# Patient Record
Sex: FEMALE | Race: WHITE | NOT HISPANIC OR LATINO | Employment: FULL TIME | URBAN - METROPOLITAN AREA
[De-identification: names, ages, dates, MRNs, and addresses within clinical notes are randomized per-mention and may not be internally consistent; named-entity substitution may affect disease eponyms.]

---

## 2017-05-19 ENCOUNTER — OFFICE VISIT - RIVER FALLS (OUTPATIENT)
Dept: FAMILY MEDICINE | Facility: CLINIC | Age: 47
End: 2017-05-19

## 2017-05-19 ASSESSMENT — MIFFLIN-ST. JEOR: SCORE: 1265.86

## 2017-08-04 ENCOUNTER — OFFICE VISIT - RIVER FALLS (OUTPATIENT)
Dept: FAMILY MEDICINE | Facility: CLINIC | Age: 47
End: 2017-08-04

## 2017-08-11 ENCOUNTER — AMBULATORY - RIVER FALLS (OUTPATIENT)
Dept: FAMILY MEDICINE | Facility: CLINIC | Age: 47
End: 2017-08-11

## 2017-08-12 LAB
CHOLEST SERPL-MCNC: 222 MG/DL (ref 125–200)
CHOLEST/HDLC SERPL: 4.7 {RATIO}
GLUCOSE BLD-MCNC: 84 MG/DL (ref 65–99)
HDLC SERPL-MCNC: 47 MG/DL
LDLC SERPL CALC-MCNC: 146 MG/DL
NONHDLC SERPL-MCNC: 175 MG/DL
TRIGL SERPL-MCNC: 144 MG/DL

## 2017-08-18 ENCOUNTER — OFFICE VISIT - RIVER FALLS (OUTPATIENT)
Dept: FAMILY MEDICINE | Facility: CLINIC | Age: 47
End: 2017-08-18

## 2018-05-22 ENCOUNTER — OFFICE VISIT - RIVER FALLS (OUTPATIENT)
Dept: FAMILY MEDICINE | Facility: CLINIC | Age: 48
End: 2018-05-22

## 2018-06-14 ENCOUNTER — OFFICE VISIT - RIVER FALLS (OUTPATIENT)
Dept: FAMILY MEDICINE | Facility: CLINIC | Age: 48
End: 2018-06-14

## 2018-06-14 ASSESSMENT — MIFFLIN-ST. JEOR: SCORE: 1289.45

## 2018-06-20 ENCOUNTER — TRANSFERRED RECORDS (OUTPATIENT)
Dept: HEALTH INFORMATION MANAGEMENT | Facility: CLINIC | Age: 48
End: 2018-06-20

## 2018-06-20 LAB — HPV ABSTRACT: NORMAL

## 2018-06-27 ENCOUNTER — AMBULATORY - RIVER FALLS (OUTPATIENT)
Dept: FAMILY MEDICINE | Facility: CLINIC | Age: 48
End: 2018-06-27

## 2018-07-02 ENCOUNTER — OFFICE VISIT - RIVER FALLS (OUTPATIENT)
Dept: FAMILY MEDICINE | Facility: CLINIC | Age: 48
End: 2018-07-02

## 2018-07-02 ASSESSMENT — MIFFLIN-ST. JEOR: SCORE: 1291.26

## 2018-08-22 ENCOUNTER — OFFICE VISIT - RIVER FALLS (OUTPATIENT)
Dept: FAMILY MEDICINE | Facility: CLINIC | Age: 48
End: 2018-08-22

## 2019-03-06 ENCOUNTER — OFFICE VISIT - RIVER FALLS (OUTPATIENT)
Dept: FAMILY MEDICINE | Facility: CLINIC | Age: 49
End: 2019-03-06

## 2019-03-07 LAB
CHOLEST SERPL-MCNC: 222 MG/DL
CHOLEST/HDLC SERPL: 5 {RATIO}
ERYTHROCYTE [DISTWIDTH] IN BLOOD BY AUTOMATED COUNT: 15.4 % (ref 11–15)
GLUCOSE BLD-MCNC: 87 MG/DL (ref 65–99)
HCT VFR BLD AUTO: 32.6 % (ref 35–45)
HDLC SERPL-MCNC: 44 MG/DL
HGB BLD-MCNC: 10.2 GM/DL (ref 11.7–15.5)
LDLC SERPL CALC-MCNC: 139 MG/DL
MCH RBC QN AUTO: 24.5 PG (ref 27–33)
MCHC RBC AUTO-ENTMCNC: 31.3 GM/DL (ref 32–36)
MCV RBC AUTO: 78.4 FL (ref 80–100)
NONHDLC SERPL-MCNC: 178 MG/DL
PLATELET # BLD AUTO: 362 10*3/UL (ref 140–400)
PMV BLD: 11.4 FL (ref 7.5–12.5)
RBC # BLD AUTO: 4.16 10*6/UL (ref 3.8–5.1)
TRIGL SERPL-MCNC: 239 MG/DL
WBC # BLD AUTO: 5.3 10*3/UL (ref 3.8–10.8)

## 2019-03-08 LAB
FERRITIN SERPL-MCNC: 5 NG/ML (ref 10–232)
IRON SATURATION: 5 (ref 11–50)
IRON SERPL-MCNC: 24 MCG/DL (ref 45–160)
TIBC - QUEST: 453 (ref 250–450)

## 2019-07-09 ENCOUNTER — COMMUNICATION - RIVER FALLS (OUTPATIENT)
Dept: FAMILY MEDICINE | Facility: CLINIC | Age: 49
End: 2019-07-09

## 2019-10-21 ENCOUNTER — AMBULATORY - RIVER FALLS (OUTPATIENT)
Dept: FAMILY MEDICINE | Facility: CLINIC | Age: 49
End: 2019-10-21

## 2019-10-22 ENCOUNTER — COMMUNICATION - RIVER FALLS (OUTPATIENT)
Dept: FAMILY MEDICINE | Facility: CLINIC | Age: 49
End: 2019-10-22

## 2019-10-22 LAB
ERYTHROCYTE [DISTWIDTH] IN BLOOD BY AUTOMATED COUNT: 15.2 % (ref 11–15)
FERRITIN SERPL-MCNC: 10 NG/ML (ref 16–232)
HCT VFR BLD AUTO: 32.7 % (ref 35–45)
HGB BLD-MCNC: 10.6 GM/DL (ref 11.7–15.5)
IRON SATURATION: 12 (ref 16–45)
IRON SERPL-MCNC: 45 MCG/DL (ref 45–160)
MCH RBC QN AUTO: 26.6 PG (ref 27–33)
MCHC RBC AUTO-ENTMCNC: 32.4 GM/DL (ref 32–36)
MCV RBC AUTO: 82.2 FL (ref 80–100)
PLATELET # BLD AUTO: 331 10*3/UL (ref 140–400)
PMV BLD: 11.7 FL (ref 7.5–12.5)
RBC # BLD AUTO: 3.98 10*6/UL (ref 3.8–5.1)
TIBC - QUEST: 382 (ref 250–450)
WBC # BLD AUTO: 5.7 10*3/UL (ref 3.8–10.8)

## 2019-11-27 ENCOUNTER — OFFICE VISIT - RIVER FALLS (OUTPATIENT)
Dept: FAMILY MEDICINE | Facility: CLINIC | Age: 49
End: 2019-11-27

## 2020-02-12 ENCOUNTER — OFFICE VISIT - RIVER FALLS (OUTPATIENT)
Dept: FAMILY MEDICINE | Facility: CLINIC | Age: 50
End: 2020-02-12

## 2020-02-21 ENCOUNTER — COMMUNICATION - RIVER FALLS (OUTPATIENT)
Dept: FAMILY MEDICINE | Facility: CLINIC | Age: 50
End: 2020-02-21

## 2020-03-10 ENCOUNTER — OFFICE VISIT - RIVER FALLS (OUTPATIENT)
Dept: FAMILY MEDICINE | Facility: CLINIC | Age: 50
End: 2020-03-10

## 2020-03-10 ASSESSMENT — MIFFLIN-ST. JEOR: SCORE: 1306.68

## 2020-03-11 LAB
BUN SERPL-MCNC: 10 MG/DL (ref 7–25)
BUN/CREAT RATIO - HISTORICAL: NORMAL (ref 6–22)
CALCIUM SERPL-MCNC: 9.5 MG/DL (ref 8.6–10.4)
CHLORIDE BLD-SCNC: 102 MMOL/L (ref 98–110)
CO2 SERPL-SCNC: 26 MMOL/L (ref 20–32)
CREAT SERPL-MCNC: 0.82 MG/DL (ref 0.5–1.05)
EGFRCR SERPLBLD CKD-EPI 2021: 83 ML/MIN/1.73M2
ERYTHROCYTE [DISTWIDTH] IN BLOOD BY AUTOMATED COUNT: 14.8 % (ref 11–15)
GLUCOSE BLD-MCNC: 89 MG/DL (ref 65–99)
HCT VFR BLD AUTO: 34.5 % (ref 35–45)
HGB BLD-MCNC: 11.5 GM/DL (ref 11.7–15.5)
MCH RBC QN AUTO: 27.8 PG (ref 27–33)
MCHC RBC AUTO-ENTMCNC: 33.3 GM/DL (ref 32–36)
MCV RBC AUTO: 83.3 FL (ref 80–100)
PLATELET # BLD AUTO: 334 10*3/UL (ref 140–400)
PMV BLD: 11.7 FL (ref 7.5–12.5)
POTASSIUM BLD-SCNC: 4 MMOL/L (ref 3.5–5.3)
RBC # BLD AUTO: 4.14 10*6/UL (ref 3.8–5.1)
SODIUM SERPL-SCNC: 136 MMOL/L (ref 135–146)
WBC # BLD AUTO: 6.3 10*3/UL (ref 3.8–10.8)

## 2020-03-12 ENCOUNTER — COMMUNICATION - RIVER FALLS (OUTPATIENT)
Dept: FAMILY MEDICINE | Facility: CLINIC | Age: 50
End: 2020-03-12

## 2020-03-18 ENCOUNTER — OFFICE VISIT - RIVER FALLS (OUTPATIENT)
Dept: FAMILY MEDICINE | Facility: CLINIC | Age: 50
End: 2020-03-18

## 2020-04-21 LAB — COLOGUARD-ABSTRACT: NEGATIVE

## 2020-05-22 ENCOUNTER — COMMUNICATION - RIVER FALLS (OUTPATIENT)
Dept: FAMILY MEDICINE | Facility: CLINIC | Age: 50
End: 2020-05-22

## 2020-05-22 ENCOUNTER — OFFICE VISIT - RIVER FALLS (OUTPATIENT)
Dept: FAMILY MEDICINE | Facility: CLINIC | Age: 50
End: 2020-05-22

## 2020-05-22 LAB
HGB BLD-MCNC: 11.8 G/DL (ref 12–16)
MCV RBC AUTO: 87 FL (ref 80–100)

## 2020-06-03 ENCOUNTER — OFFICE VISIT - RIVER FALLS (OUTPATIENT)
Dept: FAMILY MEDICINE | Facility: CLINIC | Age: 50
End: 2020-06-03

## 2020-06-05 ENCOUNTER — OFFICE VISIT - RIVER FALLS (OUTPATIENT)
Dept: FAMILY MEDICINE | Facility: CLINIC | Age: 50
End: 2020-06-05

## 2020-06-17 ENCOUNTER — OFFICE VISIT - RIVER FALLS (OUTPATIENT)
Dept: FAMILY MEDICINE | Facility: CLINIC | Age: 50
End: 2020-06-17

## 2020-06-17 ASSESSMENT — MIFFLIN-ST. JEOR: SCORE: 1297.61

## 2020-08-12 ENCOUNTER — OFFICE VISIT - RIVER FALLS (OUTPATIENT)
Dept: FAMILY MEDICINE | Facility: CLINIC | Age: 50
End: 2020-08-12

## 2020-08-12 ASSESSMENT — MIFFLIN-ST. JEOR: SCORE: 1298.15

## 2020-11-10 ENCOUNTER — OFFICE VISIT - RIVER FALLS (OUTPATIENT)
Dept: FAMILY MEDICINE | Facility: CLINIC | Age: 50
End: 2020-11-10

## 2020-12-09 ENCOUNTER — OFFICE VISIT - RIVER FALLS (OUTPATIENT)
Dept: FAMILY MEDICINE | Facility: CLINIC | Age: 50
End: 2020-12-09

## 2020-12-09 ASSESSMENT — MIFFLIN-ST. JEOR: SCORE: 1289.08

## 2020-12-10 LAB
BUN SERPL-MCNC: 10 MG/DL (ref 7–25)
BUN/CREAT RATIO - HISTORICAL: NORMAL (ref 6–22)
CALCIUM SERPL-MCNC: 9.4 MG/DL (ref 8.6–10.4)
CHLORIDE BLD-SCNC: 102 MMOL/L (ref 98–110)
CHOLEST SERPL-MCNC: 242 MG/DL
CHOLEST/HDLC SERPL: 4.2 {RATIO}
CO2 SERPL-SCNC: 28 MMOL/L (ref 20–32)
CREAT SERPL-MCNC: 0.83 MG/DL (ref 0.5–1.05)
EGFRCR SERPLBLD CKD-EPI 2021: 81 ML/MIN/1.73M2
ERYTHROCYTE [DISTWIDTH] IN BLOOD BY AUTOMATED COUNT: 15 % (ref 11–15)
FERRITIN SERPL-MCNC: 22 NG/ML (ref 16–232)
GLUCOSE BLD-MCNC: 85 MG/DL (ref 65–99)
HCT VFR BLD AUTO: 39.2 % (ref 35–45)
HDLC SERPL-MCNC: 58 MG/DL
HGB BLD-MCNC: 12.7 GM/DL (ref 11.7–15.5)
IRON SATURATION: 22 (ref 16–45)
IRON SERPL-MCNC: 83 MCG/DL (ref 45–160)
LDLC SERPL CALC-MCNC: 148 MG/DL
MCH RBC QN AUTO: 28.7 PG (ref 27–33)
MCHC RBC AUTO-ENTMCNC: 32.4 GM/DL (ref 32–36)
MCV RBC AUTO: 88.5 FL (ref 80–100)
NONHDLC SERPL-MCNC: 184 MG/DL
PLATELET # BLD AUTO: 283 10*3/UL (ref 140–400)
PMV BLD: 11.3 FL (ref 7.5–12.5)
POTASSIUM BLD-SCNC: 4.2 MMOL/L (ref 3.5–5.3)
RBC # BLD AUTO: 4.43 10*6/UL (ref 3.8–5.1)
SODIUM SERPL-SCNC: 138 MMOL/L (ref 135–146)
TIBC - QUEST: 379 (ref 250–450)
TRIGL SERPL-MCNC: 217 MG/DL
TSH SERPL DL<=0.005 MIU/L-ACNC: 16.18 MIU/L
WBC # BLD AUTO: 4.8 10*3/UL (ref 3.8–10.8)

## 2020-12-11 ENCOUNTER — COMMUNICATION - RIVER FALLS (OUTPATIENT)
Dept: FAMILY MEDICINE | Facility: CLINIC | Age: 50
End: 2020-12-11

## 2021-07-08 ENCOUNTER — AMBULATORY - RIVER FALLS (OUTPATIENT)
Dept: FAMILY MEDICINE | Facility: CLINIC | Age: 51
End: 2021-07-08

## 2021-07-09 LAB
BUN SERPL-MCNC: 12 MG/DL (ref 7–25)
BUN/CREAT RATIO - HISTORICAL: ABNORMAL (ref 6–22)
CALCIUM SERPL-MCNC: 9.7 MG/DL (ref 8.6–10.4)
CHLORIDE BLD-SCNC: 100 MMOL/L (ref 98–110)
CHOLEST SERPL-MCNC: 239 MG/DL
CHOLEST/HDLC SERPL: 4.3 {RATIO}
CO2 SERPL-SCNC: 29 MMOL/L (ref 20–32)
CREAT SERPL-MCNC: 0.9 MG/DL (ref 0.5–1.05)
EGFRCR SERPLBLD CKD-EPI 2021: 74 ML/MIN/1.73M2
GLUCOSE BLD-MCNC: 107 MG/DL (ref 65–99)
HDLC SERPL-MCNC: 55 MG/DL
LDLC SERPL CALC-MCNC: 142 MG/DL
NONHDLC SERPL-MCNC: 184 MG/DL
POTASSIUM BLD-SCNC: 3.9 MMOL/L (ref 3.5–5.3)
SODIUM SERPL-SCNC: 139 MMOL/L (ref 135–146)
TRIGL SERPL-MCNC: 295 MG/DL
TSH SERPL DL<=0.005 MIU/L-ACNC: 12.79 MIU/L

## 2021-07-13 ENCOUNTER — OFFICE VISIT - RIVER FALLS (OUTPATIENT)
Dept: FAMILY MEDICINE | Facility: CLINIC | Age: 51
End: 2021-07-13

## 2021-11-24 ENCOUNTER — OFFICE VISIT - RIVER FALLS (OUTPATIENT)
Dept: FAMILY MEDICINE | Facility: CLINIC | Age: 51
End: 2021-11-24

## 2021-11-25 LAB — TSH SERPL DL<=0.005 MIU/L-ACNC: 3.28 MIU/L

## 2021-11-26 ENCOUNTER — COMMUNICATION - RIVER FALLS (OUTPATIENT)
Dept: FAMILY MEDICINE | Facility: CLINIC | Age: 51
End: 2021-11-26

## 2022-02-12 VITALS
HEART RATE: 87 BPM | WEIGHT: 158 LBS | BODY MASS INDEX: 26.98 KG/M2 | HEIGHT: 64 IN | DIASTOLIC BLOOD PRESSURE: 104 MMHG | TEMPERATURE: 98 F | SYSTOLIC BLOOD PRESSURE: 169 MMHG

## 2022-02-12 VITALS
HEART RATE: 85 BPM | DIASTOLIC BLOOD PRESSURE: 88 MMHG | SYSTOLIC BLOOD PRESSURE: 131 MMHG | WEIGHT: 164.6 LBS | TEMPERATURE: 97.6 F

## 2022-02-12 VITALS
HEART RATE: 64 BPM | WEIGHT: 154 LBS | SYSTOLIC BLOOD PRESSURE: 130 MMHG | HEIGHT: 64 IN | WEIGHT: 154.2 LBS | HEART RATE: 70 BPM | HEART RATE: 76 BPM | DIASTOLIC BLOOD PRESSURE: 84 MMHG | WEIGHT: 154.6 LBS | BODY MASS INDEX: 26.32 KG/M2 | BODY MASS INDEX: 26.43 KG/M2 | HEIGHT: 64 IN | SYSTOLIC BLOOD PRESSURE: 128 MMHG | SYSTOLIC BLOOD PRESSURE: 136 MMHG | BODY MASS INDEX: 26.4 KG/M2 | DIASTOLIC BLOOD PRESSURE: 88 MMHG | TEMPERATURE: 97.9 F | TEMPERATURE: 99.4 F | DIASTOLIC BLOOD PRESSURE: 76 MMHG

## 2022-02-12 VITALS
SYSTOLIC BLOOD PRESSURE: 122 MMHG | HEIGHT: 64 IN | HEART RATE: 68 BPM | DIASTOLIC BLOOD PRESSURE: 80 MMHG | BODY MASS INDEX: 25.44 KG/M2 | WEIGHT: 149 LBS

## 2022-02-12 VITALS
SYSTOLIC BLOOD PRESSURE: 118 MMHG | DIASTOLIC BLOOD PRESSURE: 81 MMHG | WEIGHT: 164.9 LBS | HEART RATE: 102 BPM | TEMPERATURE: 98.2 F | BODY MASS INDEX: 28.31 KG/M2

## 2022-02-12 VITALS
HEART RATE: 77 BPM | WEIGHT: 154.12 LBS | OXYGEN SATURATION: 96 % | DIASTOLIC BLOOD PRESSURE: 102 MMHG | TEMPERATURE: 97.7 F | HEIGHT: 64 IN | SYSTOLIC BLOOD PRESSURE: 157 MMHG | BODY MASS INDEX: 26.31 KG/M2

## 2022-02-12 VITALS
WEIGHT: 159 LBS | DIASTOLIC BLOOD PRESSURE: 108 MMHG | HEART RATE: 72 BPM | DIASTOLIC BLOOD PRESSURE: 98 MMHG | HEART RATE: 72 BPM | BODY MASS INDEX: 27.29 KG/M2 | BODY MASS INDEX: 26.78 KG/M2 | SYSTOLIC BLOOD PRESSURE: 142 MMHG | WEIGHT: 156 LBS | SYSTOLIC BLOOD PRESSURE: 166 MMHG

## 2022-02-12 VITALS
TEMPERATURE: 98 F | DIASTOLIC BLOOD PRESSURE: 80 MMHG | SYSTOLIC BLOOD PRESSURE: 132 MMHG | HEART RATE: 80 BPM | TEMPERATURE: 98.3 F | WEIGHT: 149.4 LBS

## 2022-02-12 VITALS
WEIGHT: 154.2 LBS | DIASTOLIC BLOOD PRESSURE: 80 MMHG | BODY MASS INDEX: 26.47 KG/M2 | TEMPERATURE: 98.3 F | HEART RATE: 80 BPM | SYSTOLIC BLOOD PRESSURE: 120 MMHG

## 2022-02-12 VITALS
WEIGHT: 156 LBS | WEIGHT: 156.12 LBS | HEART RATE: 100 BPM | HEART RATE: 76 BPM | TEMPERATURE: 98.8 F | SYSTOLIC BLOOD PRESSURE: 168 MMHG | HEIGHT: 64 IN | BODY MASS INDEX: 26.65 KG/M2 | TEMPERATURE: 98.4 F | DIASTOLIC BLOOD PRESSURE: 100 MMHG | HEIGHT: 64 IN | BODY MASS INDEX: 27.09 KG/M2 | DIASTOLIC BLOOD PRESSURE: 106 MMHG | SYSTOLIC BLOOD PRESSURE: 148 MMHG | SYSTOLIC BLOOD PRESSURE: 162 MMHG | DIASTOLIC BLOOD PRESSURE: 88 MMHG | HEART RATE: 91 BPM | WEIGHT: 157.8 LBS | OXYGEN SATURATION: 99 % | BODY MASS INDEX: 26.63 KG/M2

## 2022-02-12 VITALS
OXYGEN SATURATION: 98 % | TEMPERATURE: 97.9 F | SYSTOLIC BLOOD PRESSURE: 126 MMHG | HEART RATE: 80 BPM | BODY MASS INDEX: 27.46 KG/M2 | WEIGHT: 160 LBS | DIASTOLIC BLOOD PRESSURE: 82 MMHG

## 2022-02-15 NOTE — PROGRESS NOTES
Patient:   EDITH GODINEZ            MRN: [id number]            FIN: [id number]               Age:   49 years     Sex:  Female     :  1970   Associated Diagnoses:   Menorrhagia   Author:   Arsen Flood MD      Visit Information      Date of Service: 2020 09:48 am  Performing Location: [facility]  Encounter#: [id number]      Primary Care Provider (PCP):  Rj MONTGOMERY, Gonzalo    NPI# [id number]      Referring Provider:  Jose Mondragon MD    NPI# [id number]      Chief Complaint   2020 9:52 AM CST    Discuss hysterectomy.      Interval History   The patient is a 49-year-old female returning today to discuss hysterectomy.  She has menorrhagia and fairly severe migraines the day prior to and the day of onset of menses.  She otherwise has no GYN concerns.  On GYN review she does admit to having some incontinence with sneezing but it is fairly minor and she can usually avoid it by squeezing her legs together.  She has no other active medical problems.  She had no GYN surgeries and normal vaginal deliveries.        Review of Systems   Review  of systems is negative except as documented under interval history.      Health Status   Allergies:    Allergic Reactions (Selected)  No Known Medication Allergies   Medications:  (Selected)   Prescriptions  Prescribed  SUMAtriptan 25 mg oral tablet: See Instructions, Instructions: TAKE ONE TO TWO TABLETS BY MOUTH ONCE AT ONSET OF MIGRAINE HEADACHE AS NEEDED AS DIRECTED, # 18 tab(s), 11 Refill(s), Type: Soft Stop, Pharmacy: [facility], TAKE ONE TO TWO TABLETS BY MOUTH ONCE...  Vitron-C 125 mg-65 mg oral tablet: 1 tab(s), Oral, daily, # 90 tab(s), 3 Refill(s), Type: Maintenance, Pharmacy: [facility], 1 tab(s) Oral daily  amitriptyline 50 mg oral tablet: = 1 tab(s) ( 50 mg ), Oral, hs, # 90 tab(s), 3 Refill(s), Type: Maintenance, Pharmacy: [facility], note dose change, 1 tab(s) Oral hs    Problem list:    All Problems  Menstrual migraine / SNOMED CT 29916212 / Confirmed  Heavy menstrual period / SNOMED CT 6793038055 / Confirmed  Resolved: Hospitalized for pneumonia  Resolved: Pregnancy / SNOMED CT 964245286  Resolved: Pregnancy / SNOMED CT 559603477  Resolved: Pregnancy / SNOMED CT 344247354      Histories   Past Medical History:    Resolved  Hospitalized for pneumonia: Onset in 1973 at 3 years.  Resolved.  Pregnancy (099591037):  Resolved in 1993 at 22 years.  Pregnancy (499395590):  Resolved in 1996 at 25 years.  Pregnancy (326789533):  Resolved in 1998 at 27 years.   Family History:    Leukemia  Mother  Comments:  7/26/2010 12:17 PM CDT - Mirna Harrison  CLL  Miscellaneous  Father  Comments:  8/5/2013 3:34 PM [facility] - Boubacar Meza MD  arthritis and noncancerous jaw tumor  Diabetes  Son (Luis Fernando)     Procedure history:    No active procedure history items have been selected or recorded.   Social History:        Alcohol Assessment            Current, 1-2 times per week, 1 drinks/episode average.  2 drinks/episode maximum.      Tobacco Assessment            Never (less than 100 in lifetime)      Substance Abuse Assessment            Never      Employment and Education Assessment            Employed, Work/School description: [employer] .      Home and Environment Assessment            Marital status: .      Nutrition and Health Assessment            Type of diet: Regular.      Sexual Assessment            Sexually active: Yes.  Identifies as female, Sexual orientation: Straight or heterosexual.        Physical Examination   Vital Signs   2/12/2020 9:52 AM CST Peripheral Pulse Rate 72 bpm    Pulse Site Radial artery    HR Method Manual    Systolic Blood Pressure 166 mmHg  HI    Diastolic Blood Pressure 108 mmHg  HI    Mean Arterial Pressure 127 mmHg    BP Site Right arm    BP Method Manual      Gastrointestinal:       Abdomen: Abdomen is soft and nontender without masses..     Gynecologic:  External genitalia, vagina, and cervix are normal in appearance.  Bimanual examination finds the uterus slightly enlarged with no definite pelvic masses.  Ovaries were not palpated..       Impression and Plan   Diagnosis     Menorrhagia (BYZ05-QL N92.0).     Menorrhagia with secondary anemia..     Plan:  The patient will continue her iron now until surgery.  We discussed oophorectomy and she would like to have her ovaries out after our discussion.  We will plan laparoscopic assisted vaginal hysterectomy with bilateral salpingo-oophorectomy.  Scheduling will be done soon and she will see Dr. De La Rosa for preoperative visit..    Patient Instructions:       Counseled: Patient, Regarding diagnosis, Regarding treatment, Regarding medications, Verbalized understanding.

## 2022-02-15 NOTE — LETTER
(Inserted Image. Unable to display)   [facility] [place] [address]  November 26, 2021      EDITH GODINEZ   [place] [id number]        Dear EDITH,     Thank you for selecting [facility] (previously [facility]) for your healthcare needs. Below you will find the results of your recent test(s) done at our clinic.      Thyroid levels are now within range.  Plans to remain on current dosing indefinitely.      Result Name Current Result Previous Result   TSH (mIU/L)  3.28 11/24/2021 ((H)) 12.79 7/8/2021       Please contact me or my assistant at [phone] if you have any questions or concerns.     Sincerely,        Gonzalo De La Rosa MD    What do your labs mean?  Below is a glossary of commonly ordered labs:  LDL - Bad Cholesterol  HDL - Good Cholesterol  AST/ALT - Liver Function  Cr/Creatinine - Kidney Function  Microalbumin - Kidney Function  BUN - Kidney Function  PSA - Prostate   TSH - Thyroid Hormone  HgbA1c - Diabetes Test  Hgb (Hemoglobin) - Red Blood Cells

## 2022-02-15 NOTE — LETTER
(Inserted Image. Unable to display)   March 12, 2020      EDITH GODINEZ   [place], [place] [id number]        Dear EDITH,     Thank you for selecting [facility] ([facility]viously [facility] for your healthcare needs. Below you will find the results of your recent test(s) done at our clinic.      Pre-op labs are stable.  Stable degree of iron deficiency.      Result Name Current Result Previous Result Reference Range   Sodium Level (mmol/L)  136 3/10/2020  135 - 146   Potassium Level (mmol/L)  4.0 3/10/2020  3.5 - 5.3   Chloride Level (mmol/L)  102 3/10/2020  98 - 110   CO2 Level (mmol/L)  26 3/10/2020  20 - 32   Glucose Level (mg/dL)  89 3/10/2020  87 3/6/2019 65 - 99   BUN (mg/dL)  10 3/10/2020  7 - 25   Creatinine Level (mg/dL)  0.82 3/10/2020  0.50 - 1.05   BUN/Creat Ratio  NOT APPLICABLE 3/10/2020  6 - 22   eGFR (mL/min/1.73m2)  83 3/10/2020  > OR = 60 -    eGFR  (mL/min/1.73m2)  96 3/10/2020  > OR = 60 -    Calcium Level (mg/dL)  9.5 3/10/2020  8.6 - 10.4   Iron Level (mcg/dL) ((L)) 42 3/10/2020  45 10/21/2019 45 - 160   TIBC  398 3/10/2020  382 10/21/2019 250 - 450   Iron Saturation ((L)) 11 3/10/2020 ((L)) 12 10/21/2019 16 - 45   Ferritin (ng/mL) ((L)) 10 3/10/2020 ((L)) 10 10/21/2019 16 - 232   WBC  6.3 3/10/2020  5.7 10/21/2019 3.8 - 10.8   RBC  4.14 3/10/2020  3.98 10/21/2019 3.80 - 5.10   Hgb (gm/dL) ((L)) 11.5 3/10/2020 ((L)) 10.6 10/21/2019 11.7 - 15.5   Hct (%) ((L)) 34.5 3/10/2020 ((L)) 32.7 10/21/2019 35.0 - 45.0   MCV (fL)  83.3 3/10/2020  82.2 10/21/2019 80.0 - 100.0   MCH (pg)  27.8 3/10/2020 ((L)) 26.6 10/21/2019 27.0 - 33.0   MCHC (gm/dL)  33.3 3/10/2020  32.4 10/21/2019 32.0 - 36.0   RDW (%)  14.8 3/10/2020 ((H)) 15.2 10/21/2019 11.0 - 15.0   Platelet  334 3/10/2020  331 10/21/2019 140 - 400   MPV (fL)  11.7 3/10/2020  11.7 10/21/2019 7.5 - 12.5   Test in Question - Test(s) Ordered   FERRITIN IRON AND TOTAL IRON 3/10/2020   FERRITIN IRON  AND TOTAL IRON 3/6/2019    Misc Test CPT Code   457SB 7573SB 3/10/2020   457SB 7573SB 3/6/2019    Misc Test Client Contact  DR ADAMS 3/10/2020  ROBBI ADAMS 3/6/2019    Misc Test Comment  See comment 3/10/2020  See comment 3/10/2020    Misc Test Comment  See comment 3/10/2020  See comment 3/10/2020        Please contact me or my assistant at [phone] if you have any questions or concerns.     Sincerely,        Gonzalo Adams MD    What do your labs mean?  Below is a glossary of commonly ordered labs:  LDL - Bad Cholesterol  HDL - Good Cholesterol  AST/ALT - Liver Function  Cr/Creatinine - Kidney Function  Microalbumin - Kidney Function  BUN - Kidney Function  PSA - Prostate   TSH - Thyroid Hormone  HgbA1c - Diabetes Test  Hgb (Hemoglobin) - Red Blood Cells

## 2022-02-15 NOTE — NURSING NOTE
Comprehensive Intake Entered On:  5/22/2020 10:21 AM CDT    Performed On:  5/22/2020 10:18 AM CDT by Trisha Bailey CMA               Summary   Chief Complaint :   Pre-op. DOS 06/03/2020. Hysterectomy- [facility] Dr. Flood.   Menstrual Status :   Menarcheal   Weight Measured :   157.8 lb(Converted to: 157 lb 13 oz, 71.58 kg)    Systolic Blood Pressure :   158 mmHg (HI)    Diastolic Blood Pressure :   88 mmHg   Mean Arterial Pressure :   111 mmHg   Peripheral Pulse Rate :   100 bpm   BP Site :   Right arm   BP Method :   Manual   HR Method :   Electronic   Temperature Tympanic :   98.4 DegF(Converted to: 36.9 DegC)    Oxygen Saturation :   99 %   Trisha Bailey CMA - 5/22/2020 10:18 AM CDT   Health Status   Allergies Verified? :   Yes   Medication History Verified? :   Yes   Pre-Visit Planning Status :   N/A   Tobacco Use? :   Never smoker   Trisha Bailey CMA - 5/22/2020 10:18 AM CDT   Consents   Consent for Immunization Exchange :   Consent Granted   Consent for Immunizations to Providers :   Consent Granted   Trisha Bailey CMA - 5/22/2020 10:18 AM CDT   Meds / Allergies   (As Of: 5/22/2020 10:21:29 AM CDT)   Allergies (Active)   No Known Medication Allergies  Estimated Onset Date:   Unspecified ; Created By:   Chastity Kuhn CMA; Reaction Status:   Active ; Category:   Drug ; Substance:   No Known Medication Allergies ; Type:   Allergy ; Updated By:   Chastity Kuhn CMA; Reviewed Date:   7/2/2018 12:48 PM CDT        Medication List   (As Of: 5/22/2020 10:21:29 AM CDT)   Prescription/Discharge Order    amitriptyline  :   amitriptyline ; Status:   Prescribed ; Ordered As Mnemonic:   amitriptyline 50 mg oral tablet ; Simple Display Line:   50 mg, 1 tab(s), Oral, hs, 90 tab(s), 3 Refill(s) ; Ordering Provider:   Gonzalo De La Rosa MD; Catalog Code:   amitriptyline ; Order Dt/Tm:   11/27/2019 4:40:21 PM CST          ascorbic acid-carbonyl iron  :   ascorbic acid-carbonyl iron ; Status:   Prescribed ; Ordered As Mnemonic:   Vitron-C  125 mg-65 mg oral tablet ; Simple Display Line:   1 tab(s), Oral, daily, 90 tab(s), 3 Refill(s) ; Ordering Provider:   Gonzalo De La Rosa MD; Catalog Code:   ascorbic acid-carbonyl iron ; Order Dt/Tm:   3/7/2019 10:53:26 AM CST          SUMAtriptan  :   SUMAtriptan ; Status:   Prescribed ; Ordered As Mnemonic:   SUMAtriptan 25 mg oral tablet ; Simple Display Line:   See Instructions, TAKE ONE TO TWO TABLETS BY MOUTH ONCE AT ONSET OF MIGRAINE HEADACHE AS NEEDED AS DIRECTED, 18 unknown unit, 3 Refill(s) ; Ordering Provider:   Gonzalo De La Rosa MD; Catalog Code:   SUMAtriptan ; Order Dt/Tm:   3/24/2020 11:22:44 AM CDT            ID Risk Screen   Recent Travel History :   No recent travel   Family Member Travel History :   No recent travel   Other Exposure to Infectious Disease :   Unknown   Trisha Bailey CMA - 5/22/2020 10:18 AM CDT   More Vitals   Systolic Blood Pressure Repeat :   148 mmHg   Diastolic Blood Pressure Repeat :   88 mmHg   BP Site Repeat :   Right arm   Jose/Jessica NEWELL - 5/22/2020 10:53 AM CDT

## 2022-02-15 NOTE — PROGRESS NOTES
Patient:   YOLA GODINEZ            MRN: [id number]            FIN: [id number]               Age:   49 years     Sex:  Female     :  1970   Associated Diagnoses:   Migraine aura, persistent; Iron deficiency anemia; Menorrhagia   Author:   Gonzalo De La Rosa MD      Visit Information      Date of Service: 03/10/2020 03:48 pm  Performing Location: [facility]  Encounter#: [id number]      Primary Care Provider (PCP):  Gonzalo De La Rosa MD    NPI# [id number]      Referring Provider:  Gonzalo De La Rosa MD    NPI# [id number]      Chief Complaint   3/10/2020 3:55 PM CDT    preop - scheduled for  Lap assisted Vag hysterectomy/BSO with TAW at [facility] on 3/18  (Modified)             Additional Information:No additional information recorded during visit.   Chief complaint and symptoms as noted above and confirmed with patient.  Recent lab and diagnostic studies reviewed with patient      History of Present Illness   2014: Yola presents to clinic with worsening migraine headaches.  She says that headaches typically have a predictable or a with left-sided cheek tingling and pain followed by headache extending down into neck and back of head.  Headaches typically can last 1-2 days.  Usually are fairly responsive to higher dose ibuprofen; 800-1600 mg a day.  More recently is finding that ibuprofen has not been as effective.  Typically states that headaches occur approximately every 2 weeks.  She strictly avoids ibuprofen when not having migraine headaches.  Historically has not found Excedrin to be as helpful.  She s never tried prescription medications for her migraine.  She s also never been on any maintenance therapies. 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    2019:  Yola returns for follow-up.  She has had good success with amitriptyline introduction now taking 50 mg at night.  Her migraine frequency is essentially now only limited to development of menses.  Has been taking Imitrex preemptively just the beginning of her periods.   Had not been very dedicated to oral iron therapy though has try to increase regular consumption over the last few months.  Has only been taking once a day.  Still has heavy menses typically having bleeding I will persist for 4 to 5 days with some clotting.  She has been doing quite a bit of research on her own about whether she wants to pursue hysterectomy.  She has appropriate questions here today.    3/10/2020: Yola presents for preoperative evaluation before planned total hysterectomy due to history of menorrhagia and iron deficiency anemia.  Surgery scheduled for March 18.  She has no significant surgical history.  Only previous anesthesia exposures to removal of wisdom teeth.  No personal or family history of venous thromboembolism.  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  She states that she works in the school with recent cold with 19 cases exposure this past weekend.  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  She demonstrates no active flulike symptoms.  Denies any fever.  No other history of travel.         Review of Systems   Constitutional:  No fever, No chills.    Eye:  Negative except as documented in history of present illness.    Ear/Nose/Mouth/Throat:  Negative except as documented in history of present illness.    Respiratory:  No shortness of breath.    Cardiovascular:  No chest pain, No palpitations, No peripheral edema, No syncope.    Gastrointestinal:  No nausea, No vomiting, No abdominal pain.    Genitourinary:  No dysuria, No hematuria.    Gynecologic:  menorrhagia.    Hematology/Lymphatics:  Negative except as documented in history of present illness.    Endocrine:  No excessive thirst, No polyuria.    Immunologic:  No recurrent fevers.    Musculoskeletal:  No joint pain, No muscle pain.    Neurologic:  Alert and oriented X4, Headache, No numbness, No tingling.        Health Status   Allergies:    Allergic Reactions (Selected)  No Known Medication Allergies   Medications:  (Selected)   Prescriptions  Prescribed  SUMAtriptan 25 mg oral tablet: See Instructions, Instructions: TAKE ONE TO TWO TABLETS BY MOUTH ONCE AT ONSET OF MIGRAINE HEADACHE AS NEEDED AS DIRECTED, # 18 tab(s), 11 Refill(s), Type: Soft Stop, Pharmacy: [facility], TAKE ONE TO TWO TABLETS BY MOUTH ONCE...  Vitron-C 125 mg-65 mg oral tablet: 1 tab(s), Oral, daily, # 90 tab(s), 3 Refill(s), Type: Maintenance, Pharmacy: [facility], 1 tab(s) Oral daily  amitriptyline 50 mg oral tablet: = 1 tab(s) ( 50 mg ), Oral, hs, # 90 tab(s), 3 Refill(s), Type: Maintenance, Pharmacy: [facility], note dose change, 1 tab(s) Oral hs,    Medications          *denotes recorded medication          SUMAtriptan 25 mg oral tablet: See Instructions, TAKE ONE TO TWO TABLETS BY MOUTH ONCE AT ONSET OF MIGRAINE HEADACHE AS NEEDED AS DIRECTED, 18 tab(s), 11 Refill(s).          amitriptyline 50 mg oral tablet: 50 mg, 1 tab(s), Oral, hs, 90 tab(s), 3 Refill(s).          Vitron-C 125 mg-65 mg oral tablet: 1 tab(s), Oral, daily, 90 tab(s), 3 Refill(s).       Problem list:    All Problems  Heavy menstrual period / SNOMED CT 0386309457 / Confirmed  Menstrual migraine / SNOMED CT 82430089 / Confirmed  Resolved: Hospitalized for pneumonia  Resolved: Pregnancy / SNOMED CT 964105228  Resolved: Pregnancy / SNOMED CT 910336697  Resolved: Pregnancy / SNOMED CT 361195034      Histories   Past Medical History:    Resolved  Hospitalized for pneumonia: Onset in 1973 at 3 years.  Resolved.  Pregnancy (886402222):  Resolved in 1993 at 22 years.  Pregnancy (971139186):  Resolved in 1996 at 25 years.  Pregnancy (788316166):  Resolved in 1998 at 27 years.   Family History:    Leukemia  Mother  Comments:  7/26/2010 12:17 PM [place] - Mirna Harrison  CLL  Miscellaneous  Father  Comments:  8/5/2013  3:34 PM CDT - Boubacar Meza MD  arthritis and noncancerous jaw tumor  Diabetes  Son (Luis Fernando)     Procedure history:    No active procedure history items have been selected or recorded.   Social History:        Alcohol Assessment            Current, 1-2 times per week, 1 drinks/episode average.  2 drinks/episode maximum.      Tobacco Assessment            Never (less than 100 in lifetime)      Substance Abuse Assessment            Never      Employment and Education Assessment            Employed, Work/School description: [employer] .      Home and Environment Assessment            Marital status: .      Nutrition and Health Assessment            Type of diet: Regular.      Sexual Assessment            Sexually active: Yes.  Identifies as female, Sexual orientation: Straight or heterosexual.        Physical Examination   vital signs stable, as noted above   Vital Signs   3/10/2020 4:24 PM CDT Peripheral Pulse Rate 87 bpm    Systolic Blood Pressure 169 mmHg  HI    Diastolic Blood Pressure 104 mmHg  HI    Mean Arterial Pressure 126 mmHg   3/10/2020 3:55 PM CDT Temperature Tympanic 98 DegF    Peripheral Pulse Rate 88 bpm    Systolic Blood Pressure 158 mmHg  HI    Diastolic Blood Pressure 100 mmHg  HI    Mean Arterial Pressure 119 mmHg    BP Site Right arm      Measurements from flowsheet : Measurements   3/10/2020 4:24 PM CDT Height Measured - Standard 64 in   3/10/2020 3:55 PM CDT Height Measured - Standard 64 in    Weight Measured - Standard 158 lb    BSA 1.8 m2    Body Mass Index 27.12 kg/m2  HI      General:  Alert and oriented, No acute distress.    Eye:  Pupils are equal, round and reactive to light, Extraocular movements are intact.    HENT:  Normocephalic.    Neck:  Supple, Non-tender.    Respiratory:  Lungs are clear to auscultation, Respirations are non-labored.    Cardiovascular:  Normal rate, Regular rhythm, No murmur, No edema.    Gastrointestinal:  Soft, Non-tender, Non-distended.     Neurologic:  Alert, Oriented, Normal motor function, No focal deficits.    Cognition and Speech:  Oriented, Speech clear and coherent.    Psychiatric:  Appropriate mood & affect.       Review / Management   Results review      Impression and Plan   Diagnosis     Migraine aura, persistent (HVX12-JW G43.509).     Iron deficiency anemia (EQF04-SE D50.9).     Menorrhagia (QHW02-XK N92.0).         .) pre-op, planned abdominal hysterectomy, 3/18/2020, [facility] with Dr. Flood  ECG: NSR  - obtain BMP, CBC  - no necessary medication changes  - no h/o VTE, anesthesia abnormalities  - advised monitoring blood pressures perioperatively  - low risk exposure to Covid19 being a teacher in "[employer] school.  Exposure to children without proven disease (only parent testing positive), also with no endorsed symptoms.  I see no justified reason to postpone elective surgery, though if developing any subsequent respiratory symptoms, then would advise testing  moderate risk procedure in setting of low risk patient profile; no further pre-operative risk assessment indicated     low risk procedure in setting of low risk patient profile; no further pre-operative risk assessment indicated     .) migraine HAs  - having success with sumatriptan 25-50mg as needed; typically occurring at time of menstuation; potential perimenopausal association  - good response since intro of amitriptyline 50mg qhs    .) iron deficiency anemia; I suspect mainly related to menorrhagia   - hgb 10.6; MCV 82   - on vitamin C + elemental iron - advised to increase to BID   - consider future hysterectomy if continued menorrhagia    .) health maintenance   - she did see TAW: normal PAP w/ HPV negative (6/2018)   - continue annual mammo   - normal FBS   - mildly elevated LDL, triglycerides   - normal FIT testing (6/2018) - will look to pursue further non-invasive testing    RTC as previously scheduled

## 2022-02-15 NOTE — TELEPHONE ENCOUNTER
---------------------  From: Judit Clemente CMA   Sent: 6/5/2020 10:18:44 AM CDT  Subject: Hysterectomy f/u     Received a message that pt is in need of pain management post hysterectomy on Wed (6/3).    I called pt and she said that was not the case-she had a f/u appt w/ [facility] this morning and just wasnt feeling up to coming in so she wants to know if it was OK to cancel. I told her that was OK and I advised to monitor for signs of infection, febrile, or uncontrolled pain. She will do this. I have transferred her to schedule a post op appt with Dr. Flood.

## 2022-02-15 NOTE — LETTER
(Inserted Image. Unable to display)   April 21, 2020      EDITH GODINEZ   [place], [place] [id number]        Dear EDITH,      Thank you for selecting [facility] ([facility] for your healthcare needs.       Cologuard testing is normal.  Current recommendations to complete every 3 years.           Please contact me or my assistant at [phone] if you have any questions or concerns.     Sincerely,        Gonzalo De La Rosa MD

## 2022-02-15 NOTE — LETTER
(Inserted Image. Unable to display)   October 22, 2019      EDITH GODINEZ   [place], [place] [id number]        Dear EDITH,     Thank you for selecting [facility] (previously [facility]) for your healthcare needs. Below you will find the results of your recent test(s) done at our clinic.      Degree of iron deficiency anemia is essentially unchanged.  Plans would be to either remain on oral iron for extended period of time or consideration of IV iron infusions for more expedited improvement in iron stores.      Result Name Current Result Previous Result Reference Range   Iron Level (mcg/dL)  45 10/21/2019 ((L)) 24 3/6/2019 45 - 160   TIBC  382 10/21/2019 ((H)) 453 3/6/2019 250 - 450   Iron Saturation ((L)) 12 10/21/2019 ((L)) 5 3/6/2019 16 - 45   Ferritin (ng/mL) ((L)) 10 10/21/2019 ((L)) 5 3/6/2019 16 - 232   WBC  5.7 10/21/2019  5.3 3/6/2019 3.8 - 10.8   RBC  3.98 10/21/2019  4.16 3/6/2019 3.80 - 5.10   Hgb (gm/dL) ((L)) 10.6 10/21/2019 ((L)) 10.2 3/6/2019 11.7 - 15.5   Hct (%) ((L)) 32.7 10/21/2019 ((L)) 32.6 3/6/2019 35.0 - 45.0   MCV (fL)  82.2 10/21/2019 ((L)) 78.4 3/6/2019 80.0 - 100.0   MCH (pg) ((L)) 26.6 10/21/2019 ((L)) 24.5 3/6/2019 27.0 - 33.0   MCHC (gm/dL)  32.4 10/21/2019 ((L)) 31.3 3/6/2019 32.0 - 36.0   RDW (%) ((H)) 15.2 10/21/2019 ((H)) 15.4 3/6/2019 11.0 - 15.0   Platelet  331 10/21/2019  362 3/6/2019 140 - 400   MPV (fL)  11.7 10/21/2019  11.4 3/6/2019 7.5 - 12.5       Please contact me or my assistant at [phone] if you have any questions or concerns.     Sincerely,        Gonzalo De La Rosa MD    What do your labs mean?  Below is a glossary of commonly ordered labs:  LDL - Bad Cholesterol  HDL - Good Cholesterol  AST/ALT - Liver Function  Cr/Creatinine - Kidney Function  Microalbumin - Kidney Function  BUN - Kidney Function  PSA - Prostate   TSH - Thyroid Hormone  HgbA1c - Diabetes Test  Hgb (Hemoglobin) - Red Blood Cells

## 2022-02-15 NOTE — TELEPHONE ENCOUNTER
---------------------  From: Chastity Khun CMA   Sent: 4/23/2019 10:59:51 AM CDT  Subject: General Message-imitrex refill     Phone Message    PCP:   DASHA      Time of Call:  0934       Person Calling:  self  Phone number:  [phone], ok LM     Returned call at: 1055    Note:   calling about refills of her imitrex  Refilled 3/6 by DASHA.  Pt notified and told her I would verify FF had script.  FF contacted     Last office visit and reason:  3/6/2019

## 2022-02-15 NOTE — PROGRESS NOTES
Patient:   EDITH GODINEZ            MRN: [id number]            FIN: [id number]               Age:   47 years     Sex:  Female     :  1970   Associated Diagnoses:   Well adult; Heavy menstrual period; Menstrual migraine   Author:   Tiera Leslie      Chief Complaint   2018 2:53 PM CDT    Pt here for annual px and pap.        Well Adult History   Well Adult History             The patient presents for well adult exam, -She is due for pap, have been normal  -She has migraines when she has her menses, they are very predictable and not always managed with Imitrex. Periods are very heavy, has to leave work at sometimes and plan vacations around this. used to use oc's without problems, she and  using NFP now. She would like to consider re starting oc's to help with heavy periods and HA.. She is a non smoker  -Vaccines UTD.  The general health status is good.  The patient's diet is described as balanced.  Exercise: none.  Associated symptoms consist of none.  Last menstrual period: regular, LMP just ending.  Medical encounters:.  Additional pertinent history: tobacco use none.        Review of Systems   Constitutional:  Negative except as documented in history of present illness.    Eye:  Negative except as documented in history of present illness.    Respiratory:  Negative except as documented in history of present illness.    Cardiovascular:  Negative except as documented in history of present illness.    Breast:  Negative.    Gastrointestinal:  Negative except as documented in history of present illness.    Genitourinary:  Negative except as documented in history of present illness.    Gynecologic:  Negative except as documented in history of present illness.    Hematology/Lymphatics:  Negative except as documented in history of present illness.    Endocrine:  Negative except as documented in history of present illness.    Immunologic:  Negative except as documented in history of present illness.     Musculoskeletal:  Negative except as documented in history of present illness.    Integumentary:  Negative except as documented in history of present illness.    Neurologic:  Negative except as documented in history of present illness.    Psychiatric:  Negative except as documented in history of present illness.              Health Status   Allergies:    Allergic Reactions (Selected)  No Known Medication Allergies   Medications:  (Selected)   Prescriptions  Prescribed  Lutera 100 mcg-20 mcg oral tablet: 1 tab(s), PO, Daily, Instructions: take 21 days of active tablets then skip placebos and start the next pack, # 84 tab(s), 0 Refill(s), Type: Maintenance, Pharmacy: [facility], 1 tab(s) po daily,Instr:take 21 days of active ta...  SUMAtriptan 25 mg oral tablet: See Instructions, Instructions: TAKE 1 TO 2 TABLET(S) BY MOUTH ONCE AS NEEDED FOR MIGRAINE HEADACHE, # 18 tab(s), 5 Refill(s), Type: Soft Stop, Pharmacy: [facility], TAKE 1 TO 2 TABLET(S) BY MOUTH ONCE AS NEEDED FOR MIGRAINE H...  Documented Medications  Documented  ibuprofen: ( 800 mg ), po, q6 hrs, PRN: as needed for menstrual pain, h/a's, 0 Refill(s), Type: Maintenance   Problem list:    All Problems  Resolved: Hospitalized for pneumonia  Resolved: Pregnancy / SNOMED CT 532382251  Resolved: Pregnancy / SNOMED CT 881593816  Resolved: Pregnancy / SNOMED CT 325969256      Histories   Past Medical History:    Resolved  Hospitalized for pneumonia: Onset in 1973 at 3 years.  Resolved.  Pregnancy (400873335):  Resolved in 1993 at 22 years.  Pregnancy (525840941):  Resolved in 1996 at 25 years.  Pregnancy (347026960):  Resolved in 1998 at 27 years.   Family History:    Mother  Leukemia  Comments:  7/26/2010 12:17 PM - Mirna Harrison  CLL  Father  Miscellaneous  Comments:  8/5/2013 3:34 PM - Boubacar Meza MD  arthritis and noncancerous jaw tumor  Sister: Yadira      History is negative.  Brother: Dom       History is negative.  Sister: Kyra      History is negative.  Brother: Gonzalo      History is negative.  Brother: Fabian      History is negative.     Procedure history:    No active procedure history items have been selected or recorded.      Physical Examination   Vital Signs   6/14/2018 2:53 PM CDT Temperature Tympanic 99.4 DegF    Peripheral Pulse Rate 64 bpm    Pulse Site Radial artery    HR Method Manual    Systolic Blood Pressure 136 mmHg  HI    Diastolic Blood Pressure 88 mmHg  HI    Mean Arterial Pressure 104 mmHg    BP Site Right arm    BP Method Manual      Measurements from flowsheet : Measurements   6/14/2018 2:53 PM CDT Height Measured - Standard 64 in    Weight Measured - Standard 154.2 lb    BSA 1.78 m2    Body Mass Index 26.47 kg/m2  HI      General:  Alert and oriented, No acute distress, vital signs stable, as noted above.    Eye:  Pupils are equal, round and reactive to light, Extraocular movements are intact, Normal conjunctiva.    HENT:  Normocephalic, Tympanic membranes are clear, Normal hearing, Oral mucosa is moist, No pharyngeal erythema.    Neck:  Supple, Non-tender, No lymphadenopathy, No thyromegaly.    Respiratory:  Lungs are clear to auscultation, Respirations are non-labored, Breath sounds are equal, Symmetrical chest wall expansion.    Cardiovascular:  Normal rate, Regular rhythm, No murmur, No edema.    Breast:  No mass, No tenderness, No discharge, Breasts examined .  No infra nor supraclavicular nodes palpable.  No axillary nodes or masses palpable.  No nipple discharge. Breasts normal throughout.    Gastrointestinal:  Soft, Non-tender, Non-distended, No organomegaly.    Genitourinary:  Normal genitalia for age and sex, No inguinal tenderness, No urethral discharge, No lesions.         Perineum: Within normal limits.         Groin/ inguinal region: Within normal limits.         Urethra: Within normal limits.         Vagina: Within normal limits.         Labia: Within normal limits.          Cervix: Within normal limits.         Uterus: Within normal limits.         Adnexa: Within normal limits.    Lymphatics:  no axillary, no supra or infraclavicular nor inguinal lymphadenopathy palpable.    Musculoskeletal:  Normal range of motion, Normal strength, No deformity, Normal gait.    Integumentary:  Warm, Dry, Pink, Intact, No rash.    Neurologic:  Alert, Oriented, Normal sensory, Normal motor function, Cranial Nerves II-XII are grossly intact.    Psychiatric:  Cooperative, Appropriate mood & affect, Normal judgment, PHQ 9/CAGE questionaire reviewed and discussed with patient,  see score.       Impression and Plan   Diagnosis     Well adult (GAD46-VP Z00.00).     Heavy menstrual period (MAS24-PM N92.0).     Menstrual migraine (AXV37-FW G43.829).     Patient Instructions:       Counseled: Patient, Regarding diagnosis, Regarding medications, Verbalized understanding, counseled on health benefits of healthy weight, regular exercise, healthy diet,  Counseled pt on use/risks/benefits of hormonal birth control use including ACHES symptoms.   recheck pt in 3 months, she will cycle continuously and start today.    Orders     Orders (Selected)   Prescriptions  Prescribed  Lutera 100 mcg-20 mcg oral tablet: 1 tab(s), PO, Daily, Instructions: take 21 days of active tablets then skip placebos and start the next pack, # 84 tab(s), 0 Refill(s), Type: Maintenance, Pharmacy: [facility], 1 tab(s) po daily,Instr:take 21 days of active ta...  SUMAtriptan 25 mg oral tablet: See Instructions, Instructions: TAKE 1 TO 2 TABLET(S) BY MOUTH ONCE AS NEEDED FOR MIGRAINE HEADACHE, # 18 tab(s), 5 Refill(s), Type: Soft Stop, Pharmacy: [facility], TAKE 1 TO 2 TABLET(S) BY MOUTH ONCE AS NEEDED FOR MIGRAINE H....

## 2022-02-15 NOTE — NURSING NOTE
Vital Signs Entered On:  12/9/2020 8:22 AM CST    Performed On:  12/9/2020 8:22 AM CST by Chastity Kuhn CMA               Vital Signs   Systolic Blood Pressure :   157 mmHg (HI)    Diastolic Blood Pressure :   102 mmHg (HI)    Mean Arterial Pressure :   120 mmHg   BP Site :   Left arm   Chastity Kuhn CMA - 12/9/2020 8:22 AM CST

## 2022-02-15 NOTE — NURSING NOTE
Comprehensive Intake Entered On:  11/27/2019 1:33 PM CST    Performed On:  11/27/2019 1:29 PM CST by Jolynn Gilliam CMA               Summary   Chief Complaint :   migraine f/u and refill.    Menstrual Status :   Menarcheal   Weight Measured :   156 lb(Converted to: 156 lb 0 oz, 70.76 kg)    Systolic Blood Pressure :   142 mmHg (HI)    Diastolic Blood Pressure :   98 mmHg (HI)    Mean Arterial Pressure :   113 mmHg   Peripheral Pulse Rate :   72 bpm   BP Site :   Right arm   Pulse Site :   Radial artery   BP Method :   Manual   HR Method :   Manual   Jolynn Gilliam CMA - 11/27/2019 1:29 PM CST   Health Status   Allergies Verified? :   Yes   Medication History Verified? :   Yes   Pre-Visit Planning Status :   Completed   Jolynn Gilliam CMA - 11/27/2019 1:29 PM CST   Meds / Allergies   (As Of: 11/27/2019 1:33:45 PM CST)   Allergies (Active)   No Known Medication Allergies  Estimated Onset Date:   Unspecified ; Created By:   Chastity Kuhn CMA; Reaction Status:   Active ; Category:   Drug ; Substance:   No Known Medication Allergies ; Type:   Allergy ; Updated By:   Chastity Kuhn CMA; Reviewed Date:   7/2/2018 12:48 PM CDT        Medication List   (As Of: 11/27/2019 1:33:45 PM CST)   Prescription/Discharge Order    amitriptyline  :   amitriptyline ; Status:   Completed ; Ordered As Mnemonic:   amitriptyline 10 mg oral tablet ; Simple Display Line:   See Instructions, start at 10mg qhs; increase by 10mg/week to goal dose of 50mg qhs as toleratd, 100 tab(s), 3 Refill(s) ; Ordering Provider:   Gonzalo De La Rosa MD; Catalog Code:   amitriptyline ; Order Dt/Tm:   3/6/2019 9:03:24 AM CST          amitriptyline  :   amitriptyline ; Status:   Prescribed ; Ordered As Mnemonic:   amitriptyline 50 mg oral tablet ; Simple Display Line:   50 mg, 1 tab(s), Oral, hs, 90 tab(s), 0 Refill(s) ; Ordering Provider:   Gonzalo De La Rosa MD; Catalog Code:   amitriptyline ; Order Dt/Tm:   7/9/2019 2:16:06 PM CDT ; Comment:   uncertain if first refill  went through-rec'd an error on my end - Thx!          ascorbic acid-carbonyl iron  :   ascorbic acid-carbonyl iron ; Status:   Prescribed ; Ordered As Mnemonic:   Vitron-C 125 mg-65 mg oral tablet ; Simple Display Line:   1 tab(s), Oral, daily, 90 tab(s), 3 Refill(s) ; Ordering Provider:   Gonzalo De La Rosa MD; Catalog Code:   ascorbic acid-carbonyl iron ; Order Dt/Tm:   3/7/2019 10:53:26 AM CST          SUMAtriptan  :   SUMAtriptan ; Status:   Prescribed ; Ordered As Mnemonic:   SUMAtriptan 25 mg oral tablet ; Simple Display Line:   See Instructions, TAKE ONE TO TWO TABLETS BY MOUTH ONCE AT ONSET OF MIGRAINE HEADACHE AS NEEDED AS DIRECTED, 18 tab(s), 11 Refill(s) ; Ordering Provider:   Gonzalo De La Rosa MD; Catalog Code:   SUMAtriptan ; Order Dt/Tm:   3/6/2019 8:53:54 AM CST

## 2022-02-15 NOTE — PROGRESS NOTES
Patient:   EDITH GODINEZ            MRN: [id number]            FIN: [id number]               Age:   49 years     Sex:  Female     :  1970   Associated Diagnoses:   Pre-op exam; Heavy periods   Author:   Tiera Leslie      Preoperative Information   Here for preop history and physical      Chief Complaint   2020 10:18 AM CDT   Pre-op. DOS 2020. Hysterectomy- [facility] Dr. Flood.   Tried to have surgery in March but unable due to COVID19  LMP 2020, they are heavy. Contracepting with NFP      Review of Systems   Constitutional:  Negative.    Weight has been stable, no nutritional concerns  No Allergies to latex, iodine, contrast dye, shell fish or local anesthetics   Eye:  Negative.    Ear/Nose/Mouth/Throat:  Negative.    Respiratory:  Negative.    No history of sleep apnea   Cardiovascular:  some anemia, last hemoglobin 11.5 mid March, has been forgetting to take her iron supplement, will start taking it once a day.    Gastrointestinal:  Negative.    Genitourinary:  Negative.    Pregnancy ruled out-   Hematology/Lymphatics:  Negative.    Endocrine:  Negative.    Immunologic:  Negative.    Musculoskeletal:  Negative.    Integumentary:  Negative.    Neurologic:  Negative.    Psychiatric:  Negative.    All other systems reviewed and negative   Has no history of anemia.  Has  no history of DVT or pulmonary embolism.  Has  no personal history of bleeding problems.   Has  no personal or family history of anesthesia reactions.  Patient does not have active tuberculosis.    S/he  has not taken aspirin or aspirin containing products in the last week.     S/he  has not taken Plavix (Clopidogrel) in the last 2 weeks.    S/he  has not taken warfarin in the past week.    S/he  has not been on corticosteroids for more than 2 weeks recently.      S/he  is not DNR before, during or after surgery.    Chest pain / SOB walking up 2 flights of steps? No  Pain in neck or jaw? No  CAD MI?  NO  Afib? NO  Heart Failure?  No  Asthma  or Bronchitis? NO  Diabetes? NO       Seizure Disorder? No  CKD? No  Thyroid Disease? No  Liver Disease No  CVA? No  FABIAN NO         Health Status   Allergies:    Allergic Reactions (Selected)  No Known Medication Allergies   Problem list:    All Problems  Heavy menstrual period / SNOMED CT 7814758359 / Confirmed  Menstrual migraine / SNOMED CT 16951379 / Confirmed  Resolved: Hospitalized for pneumonia  Resolved: Pregnancy / SNOMED CT 762270052  Resolved: Pregnancy / SNOMED CT 886734046  Resolved: Pregnancy / SNOMED CT 210761570   Medications:  (Selected)   Prescriptions  Prescribed  SUMAtriptan 25 mg oral tablet: See Instructions, Instructions: TAKE ONE TO TWO TABLETS BY MOUTH ONCE AT ONSET OF MIGRAINE HEADACHE AS NEEDED AS DIRECTED, # 18 unknown unit, 3 Refill(s), Type: Soft Stop, Pharmacy: [facility]  Vitron-C 125 mg-65 mg oral tablet: 1 tab(s), Oral, daily, # 90 tab(s), 3 Refill(s), Type: Maintenance, Pharmacy: [facility], 1 tab(s) Oral daily  amitriptyline 50 mg oral tablet: = 1 tab(s) ( 50 mg ), Oral, hs, # 90 tab(s), 3 Refill(s), Type: Maintenance, Pharmacy: [facility], note dose change, 1 tab(s) Oral hs      Histories   Past Medical History:    Resolved  Hospitalized for pneumonia: Onset in 1973 at 3 years.  Resolved.  Pregnancy (758331473):  Resolved in 1993 at 22 years.  Pregnancy (804032364):  Resolved in 1996 at 25 years.  Pregnancy (030511528):  Resolved in 1998 at 27 years.   Family History:    Leukemia  Mother  Comments:  7/26/2010 12:17 PM CDT - Mirna Harrison  CLL  Miscellaneous  Father  Comments:  8/5/2013 3:34 PM CDT - Boubacar Meza MD  arthritis and noncancerous jaw tumor  Diabetes  Son (Luis Fernando)     Procedure history:    Screening for malignant neoplasm of colon (SNOMED CT 3092938024) on 4/14/2020 at 49 Years.  Comments:  4/21/2020 8:40 AM CDT - Leilani NEWELL, Velvet  Recommendation:  f/u 3yrs    4/20/2020 11:35  AM CDT - GilAnnita mercer - Negative   Social History:        Alcohol Assessment            Current, 1-2 times per week, 1 drinks/episode average.  2 drinks/episode maximum.      Tobacco Assessment            Never (less than 100 in lifetime)      Substance Abuse Assessment            Never      Employment and Education Assessment            Employed, Work/School description: [employer] .      Home and Environment Assessment            Marital status: .      Nutrition and Health Assessment            Type of diet: Regular.      Sexual Assessment            Sexually active: Yes.  Identifies as female, Sexual orientation: Straight or heterosexual.  ,        Alcohol Assessment            Current, 1-2 times per week, 1 drinks/episode average.  2 drinks/episode maximum.      Tobacco Assessment            Never (less than 100 in lifetime)      Substance Abuse Assessment            Never      Employment and Education Assessment            Employed, Work/School description: [employer] .      Home and Environment Assessment            Marital status: .      Nutrition and Health Assessment            Type of diet: Regular.      Sexual Assessment            Sexually active: Yes.  Identifies as female, Sexual orientation: Straight or heterosexual.        Physical Examination   Vital Signs   5/22/2020 10:18 AM CDT Temperature Tympanic 98.4 DegF    Peripheral Pulse Rate 100 bpm    HR Method Electronic    Systolic Blood Pressure 158 mmHg  HI    Diastolic Blood Pressure 88 mmHg    Mean Arterial Pressure 111 mmHg    BP Site Right arm    BP Method Manual    BP Systolic Repeat 148 mmHg    BP Diastolic Repeat 88 mmHg    BP Site Repeat Right arm    Oxygen Saturation 99 %      Measurements from flowsheet : Measurements   5/22/2020 10:18 AM CDT   Weight Measured - Standard                157.8 lb     General:  Alert and oriented, No acute distress.    Eye:  Normal conjunctiva.    HENT:   Normocephalic, Tympanic membranes are clear, Oral mucosa is moist, No pharyngeal erythema, Mallampati Score 2.    Neck:  Supple, Non-tender, No carotid bruit, No jugular venous distention, No lymphadenopathy, No thyromegaly.    Respiratory:  Breath sounds are equal, Symmetrical chest wall expansion.         Respirations: Are within normal limits.         Pattern: Regular.         Breath sounds: Bilateral, Within normal limits.    Cardiovascular:  Normal rate, Regular rhythm, No murmur, Normal peripheral perfusion, No edema.    Gastrointestinal:  Soft, Non-tender, Non-distended.    Musculoskeletal:  Normal range of motion, Normal strength, Normal gait.    Integumentary:  Warm, Dry, Intact, No rash.    Neurologic:  Alert, Oriented, Normal motor function, No focal deficits.    Psychiatric:  Cooperative, Appropriate mood & affect.       Review / Management   Results review:  hemoglobin 11.8 today.       Impression and Plan   Diagnosis     Pre-op exam (HZX20-KK Z01.818).     Heavy periods (VFZ94-OV N92.0).     Condition:  Stable, cleared to Procede with surgery. SHe is currently trying to reach the [facility] to arrange for COVID19 testing preoperatively as screen.    Orders     No SBE prophylaxis or DVT prophylaxis necessary .     Informed patient to avoid aspirin and ibuprofen type products 10 days prior to surgery.

## 2022-02-15 NOTE — PROGRESS NOTES
Patient:   YOLA GODINEZ            MRN: [id number]            FIN: [id number]               Age:   48 years     Sex:  Female     :  1970   Associated Diagnoses:   Migraine aura, persistent   Author:   Gonzalo De La Rosa MD      Visit Information      Date of Service: 2019 08:35 am  Performing Location: [facility]  Encounter#: [id number]      Primary Care Provider (PCP):  Gonzalo De La Rosa MD    NPI# [id number]      Referring Provider:  Gonzalo De La Rosa MD    NPI# [id number]      Chief Complaint   3/6/2019 8:45 AM CST     f/u migraines - med refill              Additional Information:No additional information recorded during visit.   Chief complaint and symptoms as noted above and confirmed with patient.  Recent lab and diagnostic studies reviewed with patient      History of Present Illness   2014: Yola presents to clinic with worsening migraine headaches.  She says that headaches typically have a predictable or a with left-sided cheek tingling and pain followed by headache extending down into neck and back of head.  Headaches typically can last 1-2 days.  Usually are fairly responsive to higher dose ibuprofen; 800-1600 mg a day.  More recently is finding that ibuprofen has not been as effective.  Typically states that headaches occur approximately every 2 weeks.  She strictly avoids ibuprofen when not having migraine headaches.  Historically has not found Excedrin to be as helpful.  She s never tried prescription medications for her migraine.  She s also never been on any maintenance therapies. 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    2018:  Yola returns for follow-up related to her migraine headaches.  Generally responding well to intermittent use of Imitrex.  Finds a clustering of symptoms typically around menses.  Feels like she may be entering perimenopausal timeframe.  Menses still regular though becoming more intense.  Overdue for women s health evaluation.  Last Pap testing .  Turning 48 later  this summer.  Describes 2 grandparents with history of colon cancer no primary degree relatives.    3/6/2019: Yola returns to clinic for regular follow-up.  Did meet with OB/GYN related to her menstrual migraines.  She has been hesitant to pursue any other targeted treatments.  She remains on Imitrex 25-50 mg and typically uses approximately a dozen tablets per month.  She does feel like her frequency of Imitrex use has been increasing over time.  Still has been reticent to pursue any maintenance based migraine care.  Due for colorectal cancer screening.         Review of Systems   Constitutional:  No fever, No chills.    Eye:  Negative except as documented in history of present illness.    Ear/Nose/Mouth/Throat:  Negative except as documented in history of present illness.    Respiratory:  No shortness of breath.    Cardiovascular:  No chest pain, No palpitations, No peripheral edema, No syncope.    Gastrointestinal:  No nausea, No vomiting, No abdominal pain.    Genitourinary:  No dysuria, No hematuria.    Gynecologic:  menorrhagia.    Hematology/Lymphatics:  Negative except as documented in history of present illness.    Endocrine:  No excessive thirst, No polyuria.    Immunologic:  No recurrent fevers.    Musculoskeletal:  No joint pain, No muscle pain.    Neurologic:  Alert and oriented X4, Headache, No numbness, No tingling.       Health Status   Allergies:    Allergic Reactions (Selected)  No Known Medication Allergies   Medications:  (Selected)   Prescriptions  Prescribed  SUMAtriptan 25 mg oral tablet: See Instructions, Instructions: TAKE ONE TO TWO TABLETS BY MOUTH ONCE AT ONSET OF MIGRAINE HEADACHE AS NEEDED AS DIRECTED, # 18 tab(s), 11 Refill(s), Type: Soft Stop, Pharmacy: [facility], TAKE ONE TO TWO TABLETS BY MOUTH ONCE...  amitriptyline 10 mg oral tablet: See Instructions, Instructions: start at 10mg qhs; increase by 10mg/week to goal dose of 50mg qhs as toleratd, # 100  tab(s), 3 Refill(s), Type: Maintenance, Pharmacy: [facility] [place], start at 10mg qhs; increase by 10mg/week to goa...,    Medications          *denotes recorded medication          SUMAtriptan 25 mg oral tablet: See Instructions, TAKE ONE TO TWO TABLETS BY MOUTH ONCE AT ONSET OF MIGRAINE HEADACHE AS NEEDED AS DIRECTED, 18 tab(s), 11 Refill(s).          amitriptyline 10 mg oral tablet: See Instructions, start at 10mg qhs; increase by 10mg/week to goal dose of 50mg qhs as toleratd, 100 tab(s), 3 Refill(s).     Problem list:    All Problems  Heavy menstrual period / SNOMED CT 5242193219 / Confirmed  Menstrual migraine / SNOMED CT 91467962 / Confirmed  Resolved: Hospitalized for pneumonia  Resolved: Pregnancy / SNOMED CT 503648250  Resolved: Pregnancy / SNOMED CT 657812277  Resolved: Pregnancy / SNOMED CT 171265459      Histories   Past Medical History:    Resolved  Hospitalized for pneumonia: Onset in 1973 at 3 years.  Resolved.  Pregnancy (883843114):  Resolved in 1993 at 22 years.  Pregnancy (656535075):  Resolved in 1996 at 25 years.  Pregnancy (920942872):  Resolved in 1998 at 27 years.   Family History:    Leukemia  Mother  Comments:  7/26/2010 12:17 PM Mirna Flynn  CLL  Miscellaneous  Father  Comments:  8/5/2013 3:34 PM [facility] - Boubacar Meza MD  arthritis and noncancerous jaw tumor  Diabetes  Son (Luis Fernando)     Procedure history:    No active procedure history items have been selected or recorded.   Social History:        Alcohol Assessment            Current, 1-2 times per week, 1 drinks/episode average.  2 drinks/episode maximum.      Tobacco Assessment            Never (less than 100 in lifetime)      Substance Abuse Assessment            Never      Employment and Education Assessment            Employed, Work/School description: [employer] .      Home and Environment Assessment            Marital status: .      Nutrition and Health Assessment            Type of diet:  Regular.      Sexual Assessment            Sexually active: Yes.  Identifies as female, Sexual orientation: Straight or heterosexual.      Physical Examination   vital signs stable, as noted above   Vital Signs   3/6/2019 8:45 AM CST Temperature Tympanic 97.9 DegF    Peripheral Pulse Rate 80 bpm    Systolic Blood Pressure 126 mmHg    Diastolic Blood Pressure 82 mmHg  HI    Mean Arterial Pressure 97 mmHg    Oxygen Saturation 98 %      Measurements from flowsheet : Measurements   3/6/2019 8:45 AM CST     Weight Measured - Standard                160 lb     General:  Alert and oriented, No acute distress.    Eye:  Pupils are equal, round and reactive to light, Extraocular movements are intact, Normal conjunctiva.    HENT:  Normocephalic.    Neck:  Supple, No lymphadenopathy, No thyromegaly.    Respiratory:  Lungs are clear to auscultation, Respirations are non-labored.    Cardiovascular:  Normal rate, Regular rhythm.    Gastrointestinal:  Soft, Non-tender.    Musculoskeletal:  Normal range of motion, Normal strength, No tenderness.    Neurologic:  Alert, Oriented, Normal motor function, No focal deficits, Cranial Nerves II-XII are grossly intact.    Cognition and Speech:  Oriented, Speech clear and coherent.    Psychiatric:  Appropriate mood & affect.       Review / Management   Results review:  Lab results   3/6/2019 9:29 AM CST Glucose Level 87 mg/dL    Cholesterol 222 mg/dL  HI    Non-  HI    HDL 44 mg/dL  LOW    Chol/HDL Ratio 5.0  HI      HI    Triglyceride 239 mg/dL  HI    WBC 5.3    RBC 4.16    Hgb 10.2 gm/dL  LOW    Hct 32.6 %  LOW    MCV 78.4 fL  LOW    MCH 24.5 pg  LOW    MCHC 31.3 gm/dL  LOW    RDW 15.4 %  HI    Platelet 362    MPV 11.4 fL   .       Impression and Plan   Diagnosis     Migraine aura, persistent ([id number] G43.509).         .) migraine HAs  - having success with sumatriptan 25-50mg as needed; typically occurring at time of menstuation; potential perimenopausal association  - I'd  like to pursue maintenance therapy given described frequency of abortive use   - starting amitriptyline; starting at 10mg, with goals of titrating up to 50mg qhs as tolerating   - advised to let me know tolerated dose and effectiveness within next ~2 months    .) iron deficiency anemia; I suspect mainly related to menorrhagia   - hgb 10.2; MCV 78.4   - will add on iron studies   - will start on vitamin C + elemental iron   - consider future hysterectomy if continued menorrhagia    .) health maintenance   - she did see [facility]: normal PAP w/ HPV negative (6/2018)   - continue annual mammo   - normal FBS   - mildly elevated LDL, triglycerides   - arrange for Cologuard for CRC screening    RTC annually

## 2022-02-15 NOTE — TELEPHONE ENCOUNTER
Per Earnest zamora [facility] ([phone]), no prior auth required for 3-18-20 procedure at [facility].  Email sent to [facility] [employer].

## 2022-02-15 NOTE — PROGRESS NOTES
Patient:   EDITH GODINEZ            MRN: [id number]            FIN: [id number]               Age:   48 years     Sex:  Female     :  1970   Associated Diagnoses:   Menorrhagia   Author:   Arsen Flood MD      Visit Information      Date of Service: 2018 02:30 pm  Performing Location: [facility]  Encounter#: [id number]      Primary Care Provider (PCP):  Rj MONTGOMERY, Gonzalo    NPI# [id number]      Referring Provider:  Megan Mcgregor MD    NPI# [id number]      Chief Complaint   2018 2:34 PM CDT    Consult per [facility] for heavy menses.      Interval History   The patient is referred by SANTANA Pascal, for evaluation of menstrual migraines and menorrhagia.  The patient was having fairly heavy periods and was mainly concerned about headaches that come a couple of days into the period and have been rather disabling.  She has taken ibuprofen during that time with some relief but has had to go to Imitrex to help treat the headaches on a fairly regular basis.  She misses work quite often when she is having these headaches.  Her bleeding is heavier than it used to be but is tolerable at this time but she does end up going home during her period to change clothes usually each month.  She was tried on OCP and the mini-pill to help slow down her bleeding and she got more migraines on estrogen containing pill and has pretty much bled all the time when taking the mini-pill and does not want any further hormonal attempts.  She is 48 years old and does not plan any future pregnancies.  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        Review of Systems   Review  of systems is negative except as documented under interval history.      Health Status   Allergies:    Allergic Reactions (Selected)  No Known Medication Allergies   Medications:  (Selected)   Prescriptions  Prescribed  SUMAtriptan 25 mg oral tablet: See Instructions, Instructions: TAKE 1 TO 2 TABLET(S) BY  MOUTH ONCE AS NEEDED FOR MIGRAINE HEADACHE, # 18 tab(s), 5 Refill(s), Type: Soft Stop, Pharmacy: [facility], TAKE 1 TO 2 TABLET(S) BY MOUTH ONCE AS NEEDED FOR MIGRAINE H...  amitriptyline 25 mg oral tablet: 1 tab(s) ( 25 mg ), PO, hs, # 90 tab(s), 2 Refill(s), Type: Maintenance, Pharmacy: [facility], 1 tab(s) Oral hs  norethindrone 0.35 mg oral tablet: 1 tab(s) ( 0.35 mg ), PO, Daily, # 84 tab(s), 0 Refill(s), Type: Maintenance, Pharmacy: [facility], 1 tab(s) Oral daily   Problem list:    All Problems  Menstrual migraine / SNOMED CT 66948202 / Confirmed  Heavy menstrual period / SNOMED CT 5107130989 / Confirmed  Resolved: Hospitalized for pneumonia  Resolved: Pregnancy / SNOMED CT 375268544  Resolved: Pregnancy / SNOMED CT 815858556  Resolved: Pregnancy / SNOMED CT 392443760      Histories   Past Medical History:    Resolved  Hospitalized for pneumonia: Onset in 1973 at 3 years.  Resolved.  Pregnancy (297736802):  Resolved in 1993 at 22 years.  Pregnancy (697972148):  Resolved in 1996 at 25 years.  Pregnancy (769682579):  Resolved in 1998 at 27 years.   Family History:    Leukemia  Mother  Comments:  7/26/2010 12:17 PM - Mirna Harrison  CLL  Miscellaneous  Father  Comments:  8/5/2013 3:34 PM - Boubacar Meza MD  arthritis and noncancerous jaw tumor  Diabetes  Son (Luis Fernando)     Procedure history:    No active procedure history items have been selected or recorded.   Social History:        Alcohol Assessment            Current, 1-2 times per week, 1 drinks/episode average.  2 drinks/episode maximum.      Tobacco Assessment            Never (less than 100 in lifetime)      Substance Abuse Assessment            Never      Employment and Education Assessment            Employed, Work/School description: [employer] .      Home and Environment Assessment            Marital status: .      Nutrition and Health Assessment            Type  of diet: Regular.      Sexual Assessment            Sexually active: Yes.  Identifies as female, Sexual orientation: Straight or heterosexual.        Physical Examination   Vital Signs   8/22/2018 2:34 PM CDT Peripheral Pulse Rate 76 bpm    Pulse Site Radial artery    HR Method Manual    Systolic Blood Pressure 130 mmHg    Diastolic Blood Pressure 84 mmHg  HI    Mean Arterial Pressure 99 mmHg    BP Site Right arm    BP Method Manual      Gynecologic:  No examination is done today. .       Impression and Plan   Diagnosis     Menorrhagia (OZH33-HU N92.0).     Menorrhagia with menstrual migraines.  .     Plan:  The patient's main concern seems to be her headaches and we will try amitriptyline 25 mg h.s. to see if this will diminish the migraines.  This probably will not alter her periods in any way but she may be able to limp by.  She would consider surgery if the bleeding is intolerable.  We discussed removal of her ovaries to stabilize the daily swings in estrogen and probably be on estrogen replacement initially at a low-dose to help prevent hot flashes but not trigger migraines.  She will consider this option.  We did discuss Mirena IUD and she is not interested as far as slowing down her bleeding and ablation probably would not work out the best for any treatment of headaches.  If she is less busy with work and such she will call us back potentially for scheduling and to have vaginal examination by myself. .    Patient Instructions:       Counseled: Patient, Regarding diagnosis, Regarding treatment, Regarding medications, Verbalized understanding, Total time with the patient today is 20 minutes all in face-to-face counseling. .

## 2022-02-15 NOTE — TELEPHONE ENCOUNTER
Entered by Chastity Kuhn CMA on July 09, 2019 2:15:43 PM CDT  ---------------------  From: Chastity Kuhn CMA   To: [facility]    Sent: 7/9/2019 2:15:43 PM CDT  Subject: Medication Management     ** Submitted: **  Order:amitriptyline (amitriptyline 50 mg oral tablet)  1 tab(s)  Oral  hs  Qty:  90 tab(s)        Refills:  0          Substitutions Allowed     Route To [facility]    Signed by Chastity Kuhn CMA  7/9/2019 2:14:00 PM    ** Not Approved:  **  amitriptyline (AMITRIPTYLINE HCL 10MG TABS)  TAKE ONE TABLET BY MOUTH AT BEDTIME THEN INCREASE BY ONE TABLET PER WEEK TO A GOAL DOSE OF 50MG AT BEDTIME AS TOLERATED  Qty:  100 unknown unit        Days Supply:  30        Refills:  3          Substitutions Allowed     Route To [facility]   Signed by Chastity Kuhn CMA            ------------------------------------------  From: [facility]  To: Gonzalo De La Rosa MD  Sent: July 8, 2019 9:29:58 PM CDT  Subject: Medication Management  Due: July 9, 2019 9:29:58 PM CDT    ** On Hold Pending Signature **  Drug: amitriptyline (amitriptyline 10 mg oral tablet)  TAKE ONE TABLET BY MOUTH AT BEDTIME THEN INCREASE BY ONE TABLET PER WEEK TO A GOAL DOSE OF 50MG AT BEDTIME AS TOLERATED  Quantity: 100 unknown unit Days Supply: 30        Refills: 3  Substitutions Allowed  Notes from Pharmacy:     Dispensed Drug: amitriptyline (amitriptyline 10 mg oral tablet)  TAKE ONE TABLET BY MOUTH AT BEDTIME THEN INCREASE BY ONE TABLET PER WEEK TO A GOAL DOSE OF 50MG AT BEDTIME AS TOLERATED  Quantity: 100 unknown unit Days Supply: 30        Refills: 3  Substitutions Allowed  Notes from Pharmacy:   ------------------------------------------pt due for f/u in Sept  pt states up to 50mg daily.  Told her we can refill  3mos and will plan on seeing her in the fall for f/u and labs as ordered.  Expressed understanding with no further questions.

## 2022-02-15 NOTE — PROGRESS NOTES
Patient:   EDITH GODINEZ            MRN: [id number]            FIN: [id number]               Age:   47 years     Sex:  Female     :  1970   Associated Diagnoses:   Tonsillith   Author:   Gonzalo De La Rosa MD      Visit Information      Date of Service: 2017 10:40 am  Performing Location: [facility]  Encounter#: [id number]      Primary Care Provider (PCP):  Rj MONTGOMERY, Gonzalo    NPI# [id number]      Referring Provider:  Gonzalo De La Rosa MD    NPI# [id number]      Chief Complaint   2017 10:49 AM CDT    Check tonsils - has trouble with food getting caught causing bad taste in mouth and bad breath              Additional Information:No additional information recorded during visit.   Chief complaint and symptoms as noted above and confirmed with patient.  Recent lab and diagnostic studies reviewed with patient      History of Present Illness   2017:  Presents with chronic problems with tonsiliths that form.  Frequently will  out with Qtip.  Feels like they're becoming more frequent and a nuissance.  Complaints of halitosis.  More self-conscious about this over time.        Review of Systems   Constitutional:  No fever, No chills.    Eye:  Negative except as documented in history of present illness.    Ear/Nose/Mouth/Throat:  Sore throat.    Respiratory:  No shortness of breath.    Cardiovascular:  No chest pain, No palpitations, No peripheral edema, No syncope.    Gastrointestinal:  No nausea, No vomiting, No abdominal pain.    Genitourinary:  No dysuria, No hematuria.    Hematology/Lymphatics:  Negative except as documented in history of present illness.    Endocrine:  No excessive thirst, No polyuria.    Immunologic:  No recurrent fevers.    Musculoskeletal:  No joint pain, No muscle pain.    Neurologic:  Alert and oriented X4, No numbness, No tingling, No headache.       Health Status   Allergies:    Allergic Reactions (Selected)  No known allergies   Medications:  (Selected)    Prescriptions  Prescribed  SUMAtriptan 25 mg oral tablet: 1-2 tab(s), PO, Once, PRN: for migraine headache, # 18 tab(s), 6 Refill(s), Type: Soft Stop, Pharmacy: [facility] [place], Patient is due appt.  Needs visit for additional refills, 1-2 tab(s) po once,PRN:for migraine headache  Documented Medications  Documented  Excedrin Migraine: 2 tab(s), po, q6 hrs, 0 Refill(s), Type: Maintenance  ibuprofen: 0 Refill(s), Type: Maintenance   Problem list:    All Problems  Resolved: Hospitalized for pneumonia  Resolved: Pregnancy / SNOMED CT 491446568  Resolved: Pregnancy / SNOMED CT 697465915  Resolved: Pregnancy / SNOMED CT 206569761      Histories   Past Medical History:    Resolved  Hospitalized for pneumonia: Onset in 1973 at 3 years.  Resolved.  Pregnancy (901985415):  Resolved in 1993 at 22 years.  Pregnancy (402918084):  Resolved in 1996 at 25 years.  Pregnancy (954135708):  Resolved in 1998 at 27 years.   Family History:    Leukemia  Mother  Comments:  7/26/2010 12:17 PM - Mirna Harrison  CLL  Miscellaneous  Father  Comments:  8/5/2013 3:34 PM - Boubacar Meza MD  arthritis and noncancerous jaw tumor     Procedure history:    No active procedure history items have been selected or recorded.   Social History:        Alcohol Assessment: Current            Current            Current, 1-2 times per week, 2 drinks/episode average.  3 drinks/episode maximum.      Tobacco Assessment: Denies Tobacco Use      Employment and Education Assessment            Employed, Work/School description: [employer] .      Other Assessment                     Comments:                      07/27/2010 - Boubacar Meza MD                      works at [employer]        Physical Examination   vital signs stable, as noted above   Vital Signs   8/4/2017 10:49 AM CDT Temperature Tympanic 98.3 DegF    Peripheral Pulse Rate 80 bpm    Systolic Blood Pressure 132 mmHg    Diastolic Blood Pressure 80 mmHg    Mean  Arterial Pressure 97 mmHg    BP Site Right arm      Measurements from flowsheet : Measurements   8/4/2017 10:49 AM CDT    Weight Measured - Standard                149.4 lb     General:  Alert and oriented, No acute distress.    Eye:  Extraocular movements are intact.    HENT:  Normocephalic, Oral mucosa is moist, no visible tonsillar hypertropht or visible tonsilith currently.    Neck:  Supple, No carotid bruit, No jugular venous distention, No lymphadenopathy, No thyromegaly.    Respiratory:  Lungs are clear to auscultation, Respirations are non-labored.    Cardiovascular:  Normal rate, Regular rhythm, No murmur.    Musculoskeletal:  Normal range of motion, Normal strength, No tenderness.    Neurologic:  Alert, Oriented.    Cognition and Speech:  Oriented, Speech clear and coherent.    Psychiatric:  Appropriate mood & affect.       Impression and Plan   Diagnosis     Tonsillith (RCQ42-FM J35.8).     - history sounds more suggestive of chronic tonsiliths. General reassurance provided.  Not c/w tonsilitis or enlarged tonsils.  Given her ongoing concerns and associated halitosis, will have her meet with Dr. Leger to explore whether tonsilectomy is in her interest or not.

## 2022-02-15 NOTE — PROGRESS NOTES
Patient:   YOLA GODINEZ            MRN: [id number]            FIN: [id number]               Age:   46 years     Sex:  Female     :  1970   Associated Diagnoses:   Migraine aura, persistent   Author:   Gonzalo De La Rosa MD      Visit Information      Date of Service: 2017 09:38 am  Performing Location: [facility]  Encounter#: [id number]      Primary Care Provider (PCP):  Gonzalo De La Rosa MD    NPI# [id number]      Referring Provider:  No referring provider recorded for selected visit.      Chief Complaint   2017 9:42 AM CDT    Pt is here today for med refills for migraines. Pt is doing well on medication.              Additional Information:No additional information recorded during visit.   Chief complaint and symptoms as noted above and confirmed with patient.  Recent lab and diagnostic studies reviewed with patient      History of Present Illness   2014: Yola presents to clinic with worsening migraine headaches.  She says that headaches typically have a predictable or a with left-sided cheek tingling and pain followed by headache extending down into neck and back of head.  Headaches typically can last 1-2 days.  Usually are fairly responsive to higher dose ibuprofen; 800-1600 mg a day.  More recently is finding that ibuprofen has not been as effective.  Typically states that headaches occur approximately every 2 weeks.  She strictly avoids ibuprofen when not having migraine headaches.  Historically has not found Excedrin to be as helpful.  She s never tried prescription medications for her migraine.  She s also never been on any maintenance therapies. 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    2017: Yola returns to clinic for general follow-up related to her chronic migraines.  She has had some success with Imitrex use.  Typically finds that migraines are related to her menstrual cycle.  She uses ibuprofen as abortive therapy as well.  Outside of her menstruation, describes very rare occasional  abortive therapy use.  Typically takes care of health maintenance issues in the summer when off of school.          Review of Systems   Constitutional:  No fever, No chills.    Eye:  Negative except as documented in history of present illness.    Ear/Nose/Mouth/Throat:  Negative except as documented in history of present illness.    Respiratory:  No shortness of breath.    Cardiovascular:  No chest pain, No palpitations, No peripheral edema, No syncope.    Gastrointestinal:  No nausea, No vomiting, No abdominal pain.    Genitourinary:  No dysuria, No hematuria.    Hematology/Lymphatics:  Negative except as documented in history of present illness.    Endocrine:  No excessive thirst, No polyuria.    Immunologic:  No recurrent fevers.    Musculoskeletal:  No joint pain, No muscle pain.    Neurologic:  Alert and oriented X4, Headache, No numbness, No tingling.       Health Status   Allergies:    Allergic Reactions (Selected)  No known allergies   Medications:  (Selected)   Prescriptions  Prescribed  SUMAtriptan 25 mg oral tablet: 1-2 tab(s), PO, Once, PRN: for migraine headache, # 18 tab(s), 6 Refill(s), Type: Soft Stop, Pharmacy: [facility], Patient is due appt.  Needs visit for additional refills, 1-2 tab(s) po once,PRN:for migraine headache  Documented Medications  Documented  Excedrin Migraine: 2 tab(s), po, q6 hrs, 0 Refill(s), Type: Maintenance  ibuprofen: 0 Refill(s), Type: Maintenance   Problem list:    All Problems  Resolved: Hospitalized for pneumonia  Resolved: Pregnancy / SNOMED CT 220956278  Resolved: Pregnancy / SNOMED CT 767352701  Resolved: Pregnancy / SNOMED CT 813743253      Histories   Past Medical History:    Resolved  Hospitalized for pneumonia: Onset in 1973 at 3 years.  Resolved.  Pregnancy (291813651):  Resolved in 1993 at 22 years.  Pregnancy (392501287):  Resolved in 1996 at 25 years.  Pregnancy (756636219):  Resolved in 1998 at 27 years.   Family History:     Leukemia  Mother  Comments:  7/26/2010 12:17 PM - Mirna Harrison  CLL  Miscellaneous  Father  Comments:  8/5/2013 3:34 PM - Boubacar Meza MD  arthritis and noncancerous jaw tumor     Procedure history:    No active procedure history items have been selected or recorded.   Social History:        Alcohol Assessment: Current            Current            Current, 1-2 times per week, 2 drinks/episode average.  3 drinks/episode maximum.      Tobacco Assessment: Denies Tobacco Use      Employment and Education Assessment            Employed, Work/School description: [employer] .      Other Assessment                     Comments:                      07/27/2010 - Boubacar Meza MD                      works at [employer]        Physical Examination   vital signs stable, as noted above   Vital Signs   5/19/2017 9:42 AM CDT Peripheral Pulse Rate 68 bpm    Pulse Site Radial artery    HR Method Manual    Systolic Blood Pressure 122 mmHg    Diastolic Blood Pressure 80 mmHg    Mean Arterial Pressure 94 mmHg    BP Site Right arm    BP Method Manual      Measurements from flowsheet : Measurements   5/19/2017 9:42 AM CDT Height Measured - Standard 64 in    Weight Measured - Standard 149 lb    BSA 1.75 m2    Body Mass Index 25.57 kg/m2      General:  Alert and oriented, No acute distress.    Eye:  Pupils are equal, round and reactive to light, Extraocular movements are intact, Normal conjunctiva.    HENT:  Normocephalic.    Neck:  Supple, No lymphadenopathy, No thyromegaly.    Respiratory:  Lungs are clear to auscultation, Respirations are non-labored.    Cardiovascular:  Normal rate, Regular rhythm.    Gastrointestinal:  Soft.    Musculoskeletal:  Normal range of motion, Normal strength, No tenderness.    Neurologic:  Alert, Oriented, Normal motor function, No focal deficits, Cranial Nerves II-XII are grossly intact.    Cognition and Speech:  Oriented, Speech clear and coherent.    Psychiatric:  Appropriate mood &  affect.       Impression and Plan   Diagnosis     Migraine aura, persistent (EBD79-LF G43.509).         .) migraine HAs  - having success with sumatriptan 25-50mg as needed; typically occuring at time of menstuation  - can use NSAIDs vs. Excedrin as second-line abortive therapy  - hold off on preventative maintenance therapy for now     .) health maintenance   - check FBS, A1c, and lipid panel in the near future (elevated A1c in the past)   - women's health (mammo, PAP/pelvic) planned for this summer   - will explore further about whether to pursue CRC screening now or wait until age 48    RTC annually

## 2022-02-15 NOTE — PROCEDURES
Accession Number:       [id number]  CLINICAL INFORMATION::     None given  LMP::     06/072018  PREV. PAP::     8/5/13-WNL  PREV. BX::     N/A  SOURCE::     Cervix, Endocervix  STATEMENT OF ADEQUACY::     Satisfactory for evaluation. Endocervical/transformation zone component present.  INTERPRETATION/RESULT::     Negative for intraepithelial lesion or malignancy.  COMMENT::     This Pap test has been evaluated with computer assisted technology.  CYTOTECHNOLOGIST::     CHINYERE BEAN(ASCP) CT Screening location: [place] [address]  COMMENT:     See comment       EXPLANATORY NOTE:         The Pap is a screening test for cervical cancer. It is       not a diagnostic test and is subject to false negative       and false positive results. It is most reliable when a       satisfactory sample, regularly obtained, is submitted       with relevant clinical findings and history, and when       the Pap result is evaluated along with historic and       current clinical information.  HPV mRNA E6/E7:     Not Detected       This test was performed using the APTIMA HPV Assay ([employer]       This assay detects E6/E7 viral messenger RNA (mRNA) from 14       high-risk HPV types (16,18,31,33,35,39,45,51,52,56,58,59,66,68).         The analytical performance characteristics of       this assay have been determined by [employer]. The modifications have not been       cleared or approved by the FDA. This assay has       been validated pursuant to the CLIA regulations       and is used for clinical purposes.

## 2022-02-15 NOTE — NURSING NOTE
Vital Signs Entered On:  12/9/2020 8:22 AM CST    Performed On:  12/9/2020 8:22 AM CST by Chastity Kuhn CMA               Vital Signs   Systolic Blood Pressure :   155 mmHg (HI)    Diastolic Blood Pressure :   102 mmHg (HI)    Mean Arterial Pressure :   120 mmHg   BP Site :   Right arm   Chastity Kuhn CMA - 12/9/2020 8:22 AM CST

## 2022-02-15 NOTE — LETTER
(Inserted Image. Unable to display)   November 17, 2021  [place] [address]        Dear EDITH,    Thank you for selecting [facility] for your healthcare needs.    Our records indicate you are due for the following services:     Non-Fasting Labs    If you had your labs done at another facility or with Direct Access Lab Testing at [facility], please bring in a copy of the results to your next visit, mail a copy, or drop off a copy of your results to your Healthcare Provider.     (FYI   Regarding office visits: In some instances, a video visit or telephone visit may be offered as an option.)    To schedule an appointment or if you have further questions, please contact your clinic at [phone].    Powered by [employer]    Sincerely,    Gonzalo De La Rosa MD

## 2022-02-15 NOTE — TELEPHONE ENCOUNTER
---------------------  From: Zaheer HOGANMichaela   Sent: 2/3/2020 11:31:04 AM CST  Subject: Hysterectomy request     PCP:   DASHA      Time of Call:  1124       Person Calling:  Patient  Phone number:  [phone]  ok    Returned call at: LDFCB @ 4960    Note:   Patient called stating she saw Dr Flood and would like to now schedule a hysterectomy. [facility] w/ Dr Flood to discuss as her last visit w/ him was on 8/22/2018.    Last office visit associated and reason:  8/22/2018 Gyn w/ Remigio

## 2022-02-15 NOTE — PROGRESS NOTES
Patient:   YOLA GODINEZ            MRN: [id number]            FIN: [id number]               Age:   50 years     Sex:  Female     :  1970   Associated Diagnoses:   Migraine aura, persistent; Anemia, iron deficiency; Menorrhagia   Author:   Gonzalo De La Rosa MD      Visit Information      Date of Service: 2020 07:59 am  Performing Location: [facility]  Encounter#: [id number]      Primary Care Provider (PCP):  Gonzalo De La Rosa MD    NPI# [id number]      Referring Provider:  Gonzalo De La Rosa MD    NPI# [id number]      Chief Complaint   2020 8:18 AM CST    f/u BP              Additional Information:No additional information recorded during visit.   Chief complaint and symptoms as noted above and confirmed with patient.  Recent lab and diagnostic studies reviewed with patient      History of Present Illness   2014: Yola presents to clinic with worsening migraine headaches.  She says that headaches typically have a predictable or a with left-sided cheek tingling and pain followed by headache extending down into neck and back of head.  Headaches typically can last 1-2 days.  Usually are fairly responsive to higher dose ibuprofen; 800-1600 mg a day.  More recently is finding that ibuprofen has not been as effective.  Typically states that headaches occur approximately every 2 weeks.  She strictly avoids ibuprofen when not having migraine headaches.  Historically has not found Excedrin to be as helpful.  She s never tried prescription medications for her migraine.  She s also never been on any maintenance therapies. 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    2020:  Yola presents to clinic for follow-up related to hypertension.  During her preoperative phase for recent hysterectomy her blood pressures remain elevated.  She has been monitoring since that time and brings in home blood pressure log for review.  Consistently she has systolic blood pressures in the 150s and 160s.  No associated symptoms.  Tolerate oral  iron poorly; currently taking every other day.  Feels well.  No complaints of fatigue.  12/9/2020: Yola returns for follow-up.  Overall doing well.  No issues since her hysterectomy.  She is on estradiol since procedure.  Was started on losartan hydrochlorothiazide at time of her last visit.  Has tolerated well.  Not monitoring blood pressures at home.  Increased stressors this year including the pandemic as well as caring for her mother after quadruple bypass surgery this summer.  Doing well with maintenance amitriptyline use.         Review of Systems   Constitutional:  No fever, No chills.    Eye:  Negative except as documented in history of present illness.    Ear/Nose/Mouth/Throat:  Negative except as documented in history of present illness.    Respiratory:  No shortness of breath.    Cardiovascular:  No chest pain, No palpitations, No peripheral edema, No syncope.    Gastrointestinal:  No nausea, No vomiting, No constipation, No abdominal pain.    Genitourinary:  No dysuria, No hematuria.    Gynecologic   Hematology/Lymphatics:  Negative except as documented in history of present illness.    Endocrine   Immunologic:  No recurrent fevers.    Musculoskeletal:  No joint pain, No muscle pain.    Neurologic:  Alert and oriented X4, Headache, No numbness, No tingling.       Health Status   Allergies:    Allergic Reactions (Selected)  No Known Medication Allergies   Medications:  (Selected)   Prescriptions  Prescribed  SUMAtriptan 25 mg oral tablet: See Instructions, Instructions: TAKE ONE TO TWO TABLETS BY MOUTH ONCE AT ONSET OF MIGRAINE HEADACHE AS NEEDED AS DIRECTED, # 18 unknown unit, 3 Refill(s), Type: Soft Stop, Pharmacy: [facility], TAKE ONE TO TWO TABLETS BY MOUTH...  Vitron-C 125 mg-65 mg oral tablet: 1 tab(s), Oral, daily, # 90 tab(s), 3 Refill(s), Type: Maintenance, Pharmacy: [facility], 1 tab(s) Oral daily  amitriptyline 50 mg oral tablet: = 1 tab(s) ( 50  mg ), Oral, hs, # 90 tab(s), 3 Refill(s), Type: Maintenance, Pharmacy: [facility], note dose change, 1 tab(s) Oral hs, 64, in, 12/09/20 8:18:00 CST, Height Measured, 154.12, lb, 12/09/20 8:18:00 CST, Weight Camille...  hydrochlorothiazide-losartan 25 mg-100 mg oral tablet: 1 tab(s), Oral, daily, # 90 tab(s), 3 Refill(s), Type: Maintenance, Pharmacy: [facility], 1 tab(s) Oral daily, 64, in, 12/09/20 8:18:00 CST, Height Measured, 154.12, lb, 12/09/20 8:18:00 CST, Weight Measured,    Medications          *denotes recorded medication          SUMAtriptan 25 mg oral tablet: See Instructions, TAKE ONE TO TWO TABLETS BY MOUTH ONCE AT ONSET OF MIGRAINE HEADACHE AS NEEDED AS DIRECTED, 18 unknown unit, 3 Refill(s).          amitriptyline 50 mg oral tablet: 50 mg, 1 tab(s), Oral, hs, 90 tab(s), 3 Refill(s).          Vitron-C 125 mg-65 mg oral tablet: 1 tab(s), Oral, daily, 90 tab(s), 3 Refill(s).          hydrochlorothiazide-losartan 25 mg-100 mg oral tablet: 1 tab(s), Oral, daily, 90 tab(s), 3 Refill(s).       Problem list:    All Problems  Heavy menstrual period / SNOMED CT 4686273354 / Confirmed  Menstrual migraine / SNOMED CT 77888347 / Confirmed  Resolved: Hospitalized for pneumonia  Resolved: Pregnancy / SNOMED CT 983472853  Resolved: Pregnancy / SNOMED CT 081808390  Resolved: Pregnancy / SNOMED CT 759088103      Histories   Past Medical History:    Resolved  Hospitalized for pneumonia: Onset in 1973 at 3 years.  Resolved.  Pregnancy (420141297):  Resolved in 1993 at 22 years.  Pregnancy (084023338):  Resolved in 1996 at 25 years.  Pregnancy (688192168):  Resolved in 1998 at 27 years.   Family History:    Leukemia  Mother  Comments:  7/26/2010 12:17 PM MARILYN - Mirna Harrison  CLL  Miscellaneous  Father  Comments:  8/5/2013 3:34 PM CDT - Derrick MONTGOMERY, Boubacar  arthritis and noncancerous jaw tumor  Diabetes  Son (Luis Fernando)     Procedure history:    Laparoscopy assisted vaginal  hysterectomy with bilateral salpingo-oophorectomy ([id number]) on 6/3/2020 at 49 Years.  Screening for malignant neoplasm of colon (8612616443) on 4/14/2020 at 49 Years.  Comments:  4/21/2020 8:40 AM CDT - Leilani NEWELL, Velvet  Recommendation:  f/u 3yrs    4/20/2020 11:35 AM CDT - Annita Leung - Negative   Social History:        Electronic Cigarette/Vaping Assessment            Electronic Cigarette Use: Never.      Alcohol Assessment            Current, 1-2 times per week, 1 drinks/episode average.  2 drinks/episode maximum.      Tobacco Assessment            Never (less than 100 in lifetime)            Never (less than 100 in lifetime)      Substance Abuse Assessment            Never      Employment and Education Assessment            Employed, Work/School description: [employer] .      Home and Environment Assessment            Marital status: .      Nutrition and Health Assessment            Type of diet: Regular.      Sexual Assessment            Sexually active: Yes.  Identifies as female, Sexual orientation: Straight or heterosexual.        Physical Examination   vital signs stable, as noted above   Vital Signs   12/9/2020 8:22 AM CST Systolic Blood Pressure 157 mmHg  HI    Systolic Blood Pressure 155 mmHg  HI    Diastolic Blood Pressure 102 mmHg  HI    Diastolic Blood Pressure 102 mmHg  HI    Mean Arterial Pressure 120 mmHg    Mean Arterial Pressure 120 mmHg    BP Site Left arm    BP Site Right arm   12/9/2020 8:18 AM CST Temperature Tympanic 97.7 DegF  LOW    Peripheral Pulse Rate 77 bpm    Pulse Site Brachial artery    Systolic Blood Pressure 146 mmHg  HI    Diastolic Blood Pressure 96 mmHg  HI    Mean Arterial Pressure 113 mmHg    BP Site Right arm    BP Method Electronic    Oxygen Saturation 96 %      Measurements from flowsheet : Measurements   12/9/2020 8:18 AM CST Height Measured - Standard 64 in    Weight Measured - Standard 154.12 lb    BSA 1.77 m2    Body Mass Index  26.45 kg/m2  HI      General:  Alert and oriented, No acute distress.    Eye:  Pupils are equal, round and reactive to light, Extraocular movements are intact.    HENT:  Normocephalic.    Neck:  Supple, Non-tender.    Respiratory:  Lungs are clear to auscultation, Respirations are non-labored.    Cardiovascular:  Normal rate, Regular rhythm, No murmur, No edema.    Gastrointestinal:  Soft, Non-tender, Non-distended.    Neurologic:  Alert, Oriented, Normal motor function, No focal deficits.    Cognition and Speech:  Oriented, Speech clear and coherent.    Psychiatric:  Appropriate mood & affect.       Review / Management   Results review      Impression and Plan   Diagnosis     Migraine aura, persistent (MKE65-TQ G43.509).     Anemia, iron deficiency (LFC07-UV D50.9).     Menorrhagia (XIT31-II N92.0).         .) hypertension, uncontrolled  current antihypertensive regimen: losartan/hctz 50/12.5mg daily  regimen changes: increase losartan/hctz to 100/25mg daily  intolerance:  future titration/work-up plan:     .) s/p abdominal hysterectomy, 6/2020, Dr. Flood  - doing well    .) migraine HAs  - having success with sumatriptan 25-50mg as needed  - good response since intro of amitriptyline 50mg qhs    .) iron deficiency anemia; related to h/o menorrhagia   - recheck iron studies and hgb today   - poor tolerance of oral iron (taking 1-2x/week)      .) health maintenance   - TAHBSO   - continue annual mammo   - normal FBS   - mildly elevated LDL, triglycerides   - Cologuard negative '20; repeat in '23    .) Covid19 pandemic  - discussed importance of social distancing, hand hygiene, and unique aspects related to individualized health  - discussed s/s and appropriate measures in context of worsening or developing respiratory symptoms     RTC in 6 months

## 2022-02-15 NOTE — PROGRESS NOTES
Patient:   YOLA GODINEZ            MRN: [id number]            FIN: [id number]               Age:   50 years     Sex:  Female     :  1970   Associated Diagnoses:   Migraine aura, persistent; Iron deficiency anemia; Menorrhagia   Author:   Gonzalo De La Rosa MD      Visit Information      Date of Service: 2020 04:25 pm  Performing Location: [facility]  Encounter#: [id number]      Primary Care Provider (PCP):  Gonzalo De La Rosa MD    NPI# [id number]      Referring Provider:  Gonzalo De La Rosa MD    NPI# [id number]      Chief Complaint   2020 4:42 PM CDT    BP check              Additional Information:No additional information recorded during visit.   Chief complaint and symptoms as noted above and confirmed with patient.  Recent lab and diagnostic studies reviewed with patient      History of Present Illness   2014: Yola presents to clinic with worsening migraine headaches.  She says that headaches typically have a predictable or a with left-sided cheek tingling and pain followed by headache extending down into neck and back of head.  Headaches typically can last 1-2 days.  Usually are fairly responsive to higher dose ibuprofen; 800-1600 mg a day.  More recently is finding that ibuprofen has not been as effective.  Typically states that headaches occur approximately every 2 weeks.  She strictly avoids ibuprofen when not having migraine headaches.  Historically has not found Excedrin to be as helpful.  She s never tried prescription medications for her migraine.  She s also never been on any maintenance therapies. 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    2020:  Yola presents to clinic for follow-up related to hypertension.  During her preoperative phase for recent hysterectomy her blood pressures remain elevated.  She has been monitoring since that time and brings in home blood pressure log for review.  Consistently she has systolic blood pressures in the 150s and 160s.  No associated symptoms.  Tolerate oral  iron poorly; currently taking every other day.  Feels well.  No complaints of fatigue.         Review of Systems   Constitutional:  No fever, No chills.    Eye:  Negative except as documented in history of present illness.    Ear/Nose/Mouth/Throat:  Negative except as documented in history of present illness.    Respiratory:  No shortness of breath.    Cardiovascular:  No chest pain, No palpitations, No peripheral edema, No syncope.    Gastrointestinal:  Constipation, No nausea, No vomiting, No abdominal pain.    Genitourinary:  No dysuria, No hematuria.    Gynecologic   Hematology/Lymphatics:  Negative except as documented in history of present illness.    Endocrine:  No excessive thirst, No polyuria.    Immunologic:  No recurrent fevers.    Musculoskeletal:  No joint pain, No muscle pain.    Neurologic:  Alert and oriented X4, Headache, No numbness, No tingling.       Health Status   Allergies:    Allergic Reactions (Selected)  No Known Medication Allergies   Medications:  (Selected)   Prescriptions  Prescribed  SUMAtriptan 25 mg oral tablet: See Instructions, Instructions: TAKE ONE TO TWO TABLETS BY MOUTH ONCE AT ONSET OF MIGRAINE HEADACHE AS NEEDED AS DIRECTED, # 18 unknown unit, 3 Refill(s), Type: Soft Stop, Pharmacy: [facility]  Vitron-C 125 mg-65 mg oral tablet: 1 tab(s), Oral, daily, # 90 tab(s), 3 Refill(s), Type: Maintenance, Pharmacy: [facility], 1 tab(s) Oral daily  amitriptyline 50 mg oral tablet: = 1 tab(s) ( 50 mg ), Oral, hs, # 90 tab(s), 3 Refill(s), Type: Maintenance, Pharmacy: [facility], note dose change, 1 tab(s) Oral hs  hydrochlorothiazide-losartan 12.5 mg-50 mg oral tablet: 1 tab(s), Oral, daily, # 90 tab(s), 3 Refill(s), Type: Maintenance, Pharmacy: [facility], 1 tab(s) Oral daily, 64, in, 08/12/20 16:44:00 CDT, Height Measured, 156.12, lb, 08/12/20 16:42:00 CDT, Weight Measured,     Medications          *denotes recorded medication          SUMAtriptan 25 mg oral tablet: See Instructions, TAKE ONE TO TWO TABLETS BY MOUTH ONCE AT ONSET OF MIGRAINE HEADACHE AS NEEDED AS DIRECTED, 18 unknown unit, 3 Refill(s).          amitriptyline 50 mg oral tablet: 50 mg, 1 tab(s), Oral, hs, 90 tab(s), 3 Refill(s).          Vitron-C 125 mg-65 mg oral tablet: 1 tab(s), Oral, daily, 90 tab(s), 3 Refill(s).          hydrochlorothiazide-losartan 12.5 mg-50 mg oral tablet: 1 tab(s), Oral, daily, 90 tab(s), 3 Refill(s).       Problem list:    All Problems  Heavy menstrual period / SNOMED CT 7890965466 / Confirmed  Menstrual migraine / SNOMED CT 03539885 / Confirmed  Resolved: Hospitalized for pneumonia  Resolved: Pregnancy / SNOMED CT 749039925  Resolved: Pregnancy / SNOMED CT 150558469  Resolved: Pregnancy / SNOMED CT 589390933      Histories   Past Medical History:    Resolved  Hospitalized for pneumonia: Onset in 1973 at 3 years.  Resolved.  Pregnancy (386098050):  Resolved in 1993 at 22 years.  Pregnancy (775517598):  Resolved in 1996 at 25 years.  Pregnancy (517203328):  Resolved in 1998 at 27 years.   Family History:    Leukemia  Mother  Comments:  7/26/2010 12:17 PM CDT - Mirna Harrison  CLL  Miscellaneous  Father  Comments:  8/5/2013 3:34 PM CDT - Boubacar Meza MD  arthritis and noncancerous jaw tumor  Diabetes  Son (Luis Fernando)     Procedure history:    Laparoscopy assisted vaginal hysterectomy with bilateral salpingo-oophorectomy (4089547371) on 6/3/2020 at 49 Years.  Screening for malignant neoplasm of colon (8640216987) on 4/14/2020 at 49 Years.  Comments:  4/21/2020 8:40 AM CDT - Velvet Duke MA  Recommendation:  f/u 3yrs    4/20/2020 11:35 AM CDT - Annita Leung - Negative   Social History:        Alcohol Assessment            Current, 1-2 times per week, 1 drinks/episode average.  2 drinks/episode maximum.      Tobacco Assessment            Never (less than 100 in lifetime)       Substance Abuse Assessment            Never      Employment and Education Assessment            Employed, Work/School description: [employer] .      Home and Environment Assessment            Marital status: .      Nutrition and Health Assessment            Type of diet: Regular.      Sexual Assessment            Sexually active: Yes.  Identifies as female, Sexual orientation: Straight or heterosexual.        Physical Examination   vital signs stable, as noted above   Vital Signs   8/12/2020 4:44 PM CDT Peripheral Pulse Rate 91 bpm    Systolic Blood Pressure 168 mmHg  HI    Diastolic Blood Pressure 106 mmHg  HI    Mean Arterial Pressure 127 mmHg    BP Site Left arm    BP Method Electronic   8/12/2020 4:42 PM CDT Temperature Tympanic 98.8 DegF    Peripheral Pulse Rate 101 bpm  HI    Systolic Blood Pressure 173 mmHg  HI    Diastolic Blood Pressure 115 mmHg  HI    Mean Arterial Pressure 134 mmHg    BP Site Right arm    BP Method Electronic      Measurements from flowsheet : Measurements   8/12/2020 4:44 PM CDT Height Measured - Standard 64 in   8/12/2020 4:42 PM CDT Height Measured - Standard 64 in    Weight Measured - Standard 156.12 lb    BSA 1.79 m2    Body Mass Index 26.8 kg/m2  HI      General:  Alert and oriented, No acute distress.    Eye:  Pupils are equal, round and reactive to light, Extraocular movements are intact.    HENT:  Normocephalic.    Neck:  Supple, Non-tender.    Respiratory:  Lungs are clear to auscultation, Respirations are non-labored.    Cardiovascular:  Normal rate, Regular rhythm, No murmur, No edema.    Gastrointestinal:  Soft, Non-tender, Non-distended.    Neurologic:  Alert, Oriented, Normal motor function, No focal deficits.    Cognition and Speech:  Oriented, Speech clear and coherent.    Psychiatric:  Appropriate mood & affect.       Review / Management   Results review:  Lab results   5/22/2020 11:03 AM CDT Hgb 11.8 g/dL    MCV 87 fL   .       Impression and Plan    Diagnosis     Migraine aura, persistent (VLY35-PI G43.509).     Iron deficiency anemia (AYR09-FK D50.9).     Menorrhagia (QCN84-TW N92.0).         .) s/p abdominal hysterectomy, 6/2020, [facility] with Dr. Flood  - doing well    .) migraine HAs  - having success with sumatriptan 25-50mg as needed; typically occurring at time of menstruation; potential perimenopausal association  - good response since intro of amitriptyline 50mg qhs    .) iron deficiency anemia; I suspect mainly related to menorrhagia   - hgb 10.6; MCV 82   - on vitamin C + elemental iron - QOD (increased frequency limited by constipation)     .) hypertension, newly diagnosed  current antihypertensive regimen: none  regimen changes: begin on losartan/hctz 50/12.5mg daily  intolerance:  future titration/work-up plan:     .) health maintenance   - TAHBSO   - continue annual mammo   - normal FBS   - mildly elevated LDL, triglycerides   - Cologuard negative '20; repeat in '23    .) Covid19 pandemic  - discussed importance of social distancing, hand hygiene, and unique aspects related to individualized health  - discussed s/s and appropriate measures in context of worsening or developing respiratory symptoms     RTC in 2 months; BMP, TSH

## 2022-02-15 NOTE — PROGRESS NOTES
Patient:   EDITH GODINEZ            MRN: [id number]            FIN: [id number]               Age:   49 years     Sex:  Female     :  1970   Associated Diagnoses:   Other specified postprocedural states   Author:   Arsen Flood MD      Visit Information      Date of Service: 2020 03:23 pm  Performing Location: [facility]  Encounter#: [id number]      Primary Care Provider (PCP):  Gonzalo De La Rosa MD    NPI# [id number]      Referring Provider:  Arsen Flood MD    NPI# [id number]      Chief Complaint   2020 3:31 PM CDT    s/p LAVH BSO done 6/3. Pt doing well.      Interval History   The patient is in today for two week postoperative check.  She had hysterectomy with BSO on 2020.  She is doing well.  She went to work yesterday and got tired out but otherwise has really done pretty well.  She has no complaints.      Review of Systems   Review  of systems is negative except as documented under interval history.      Health Status   Allergies:    Allergic Reactions (Selected)  No Known Medication Allergies   Medications:  (Selected)   Prescriptions  Prescribed  SUMAtriptan 25 mg oral tablet: See Instructions, Instructions: TAKE ONE TO TWO TABLETS BY MOUTH ONCE AT ONSET OF MIGRAINE HEADACHE AS NEEDED AS DIRECTED, # 18 unknown unit, 3 Refill(s), Type: Soft Stop, Pharmacy: [facility]  Vitron-C 125 mg-65 mg oral tablet: 1 tab(s), Oral, daily, # 90 tab(s), 3 Refill(s), Type: Maintenance, Pharmacy: [facility], 1 tab(s) Oral daily  amitriptyline 50 mg oral tablet: = 1 tab(s) ( 50 mg ), Oral, hs, # 90 tab(s), 3 Refill(s), Type: Maintenance, Pharmacy: [facility], note dose change, 1 tab(s) Oral hs   Problem list:    All Problems  Menstrual migraine / SNOMED CT 01723087 / Confirmed  Heavy menstrual period / SNOMED CT 5545622985 / Confirmed  Resolved: Hospitalized for pneumonia  Resolved: Pregnancy / SNOMED CT 898807827  Resolved:  Pregnancy / SNOMED CT 805290831  Resolved: Pregnancy / SNOMED CT 286141638      Histories   Past Medical History:    Resolved  Hospitalized for pneumonia: Onset in 1973 at 3 years.  Resolved.  Pregnancy (801216603):  Resolved in 1993 at 22 years.  Pregnancy (590859692):  Resolved in 1996 at 25 years.  Pregnancy (624765175):  Resolved in 1998 at 27 years.   Family History:    Leukemia  Mother  Comments:  7/26/2010 12:17 PM CDT - Mirna Harrison  CLL  Miscellaneous  Father  Comments:  8/5/2013 3:34 PM CDT - Boubacar Meza MD  arthritis and noncancerous jaw tumor  Diabetes  Son (Luis Fernando)     Procedure history:    Laparoscopy assisted vaginal hysterectomy with bilateral salpingo-oophorectomy ([id number]) on 6/3/2020 at 49 Years.  Screening for malignant neoplasm of colon (6812548717) on 4/14/2020 at 49 Years.  Comments:  4/21/2020 8:40 AM CDT - Leilani NEWELL, Velvet  Recommendation:  f/u 3yrs    4/20/2020 11:35 AM CDT - Annita Leung - Negative   Social History:        Alcohol Assessment            Current, 1-2 times per week, 1 drinks/episode average.  2 drinks/episode maximum.      Tobacco Assessment            Never (less than 100 in lifetime)      Substance Abuse Assessment            Never      Employment and Education Assessment            Employed, Work/School description: [employer] .      Home and Environment Assessment            Marital status: .      Nutrition and Health Assessment            Type of diet: Regular.      Sexual Assessment            Sexually active: Yes.  Identifies as female, Sexual orientation: Straight or heterosexual.        Physical Examination   Vital Signs   6/17/2020 3:31 PM CDT Peripheral Pulse Rate 76 bpm    Pulse Site Radial artery    HR Method Manual    Systolic Blood Pressure 170 mmHg  HI    Diastolic Blood Pressure 106 mmHg  HI    Mean Arterial Pressure 127 mmHg    BP Site Right arm    BP Method Manual      Gynecologic:  Abdomen is soft and nontender  without masses.  External genitalia and vagina are healing appropriately.  Bimanual examination is normal..       Impression and Plan   Diagnosis     Other specified postprocedural states (CDW27-JH Z98.890).     Normal postoperative course..     Plan:  The patient will return p.r.n..    Patient Instructions:       Counseled: Patient, Regarding diagnosis, Regarding treatment, Verbalized understanding.

## 2022-02-15 NOTE — LETTER
(Inserted Image. Unable to display)   Sapna 10, 2021  [place] [address]          Dear EDITH,      Thank you for selecting [facility] for your healthcare needs.    Our records indicate you are due for the following services:    Hypertension check ~ please remember to bring your at-home blood pressure readings with you to your appointment.   Fasting Lab Tests ~ Please do not eat or drink anything 10 hours prior to your scheduled appointment time.  (Water and any medications that you may need are allowed unless directed otherwise.)    If you had your labs done at another facility or with Direct Access Lab Testing at [facility], please bring in a copy of the results to your next visit, mail a copy, or drop off a copy of your results to your Healthcare Provider.    You are due for lab work and an office visit; please schedule the lab appointment 1 week before the office visit.  This will assure all results are available to discuss with your Healthcare Provider during your visit.    (FYI   Regarding office visits: In some instances, a video visit or telephone visit may be offered as an option.)      **It is very helpful if you bring your medication bottles to your appointment.  This assures we have all of your current medications, including strength and dosing information, documented accurately in your medical record.    To schedule an appointment or if you have further questions, please contact your clinic at [phone].         Powered by [employer]    Sincerely,    Gonzalo De La Rosa M.D.

## 2022-02-15 NOTE — LETTER
(Inserted Image. Unable to display)   May 22, 2020      YOLA GODINEZ       [place], [place] [id number]        Dear YOLA,    Thank you for selecting [facility] for your healthcare needs.  Below you will find the results of the recent tests done at our clinic.      Your hemoglobin is about the same, Yola. Start one iron tab per day if you can. Take care.      Result Name Current Result Previous Result Reference Range   Hgb (g/dL) ((L)) 11.8 5/22/2020 ((L)) 11.5 3/10/2020 12.0 - 16.0   MCV (fL)  87 5/22/2020  83.3 3/10/2020 80 - 100       Please contact me or my assistant at [phone] if you have any questions or concerns.     Sincerely,        ZENAIDA Pascal-NP  Family Nurse Practitioner      What do your labs mean?  Below is a glossary of commonly ordered labs:  LDL   Bad Cholesterol   HDL   Good Cholesterol  AST/ALT   Liver Function   Cr/Creatinine   Kidney Function  Microalbumin   Kidney Function  BUN   Kidney Function  PSA   Prostate    TSH   Thyroid Hormone  HgbA1c   Diabetes Test   Hgb (Hemoglobin)   Red Blood Cells  WBC   White Blood Cell Count

## 2022-02-15 NOTE — NURSING NOTE
Comprehensive Intake Entered On:  8/12/2020 4:44 PM CDT    Performed On:  8/12/2020 4:42 PM CDT by Chastity Kuhn CMA               Summary   Chief Complaint :   BP check   Menstrual Status :   Hysterectomy   Weight Measured :   156.12 lb(Converted to: 156 lb 2 oz, 70.81 kg)    Height Measured :   64 in(Converted to: 5 ft 4 in, 162.56 cm)    Body Mass Index :   26.8 kg/m2 (HI)    Body Surface Area :   1.79 m2   Systolic Blood Pressure :   173 mmHg (HI)    Diastolic Blood Pressure :   115 mmHg (HI)    Mean Arterial Pressure :   134 mmHg   Peripheral Pulse Rate :   101 bpm (HI)    BP Site :   Right arm   BP Method :   Electronic   Temperature Tympanic :   98.8 DegF(Converted to: 37.1 DegC)    Chastity Kuhn CMA - 8/12/2020 4:42 PM CDT   Health Status   Allergies Verified? :   Yes   Medication History Verified? :   Yes   Medical History Verified? :   Yes   Pre-Visit Planning Status :   Completed   Tobacco Use? :   Never smoker   Chastity Kuhn CMA - 8/12/2020 4:42 PM CDT   ID Risk Screen   Recent Travel History :   No recent travel   Family Member Travel History :   No recent travel   Other Exposure to Infectious Disease :   Unknown   Chastity Kuhn CMA - 8/12/2020 4:42 PM CDT

## 2022-02-15 NOTE — NURSING NOTE
CAGE Assessment Entered On:  3/11/2020 4:45 PM CDT    Performed On:  3/10/2020 4:45 PM CDT by Chastity Kuhn CMA               Assessment   Have you ever felt you should cut down on your drinking :   No   Have people annoyed you by criticizing your drinking :   No   Have you ever felt bad or guilty about your drinking :   No   Have you ever taken a drink first thing in the morning to steady your nerves or get rid of a hangover (Eye-opener) :   No   CAGE Score :   0    Chastity Kuhn CMA - 3/11/2020 4:45 PM CDT

## 2022-02-15 NOTE — PROGRESS NOTES
Patient:   EDITH GODINEZ            MRN: [id number]            FIN: [id number]               Age:   49 years     Sex:  Female     :  1970   Associated Diagnoses:   None   Author:   Rj MONTGOMERY, Gonazlo      Procedure   EKG procedure   Indication: pre-op.     Position: supine.     EKG findings   Interpretation: by primary care provider.     Rhythm: heart rate  88  beats/min, sinus normal.     Axis: normal axis, normal configuration.     Within normal limits.     Intervals: NE normal, QRS normal, QT normal.     Normal EKG.     P waves: normal.     QRS complex: normal, no Q waves present.     ST-T-U complex: normal.     Interpretation: no ST-T wave abnormalities.     Discussed: with patient.        Impression and Plan   Diagnosis     Normal EKG.     Orders

## 2022-02-15 NOTE — NURSING NOTE
Phone Message    PCP: DASHA    Time of call: 8:25am message left    Person calling: Yola  Contact # : [phone]    MESSAGE: Pt calls stating that Cologuard paperwork was mailed out to her. She signed the order and now is wondering what she needs to do.     Last visit/reason: 11/27/19 - migraine/anemia    8:50am Called and spoke with pt and informed her that we will need the signed order back so we can fax it to [facility]. Pt told that she can either mail it or drop it off at the .

## 2022-02-15 NOTE — NURSING NOTE
Comprehensive Intake Entered On:  3/6/2019 8:45 AM CST    Performed On:  3/6/2019 8:45 AM CST by Chastity Kuhn CMA               Summary   Chief Complaint :   f/u migraines - med refill   Menstrual Status :   Menarcheal   Weight Measured :   160 lb(Converted to: 160 lb 0 oz, 72.57 kg)    Systolic Blood Pressure :   126 mmHg   Diastolic Blood Pressure :   82 mmHg (HI)    Mean Arterial Pressure :   97 mmHg   Peripheral Pulse Rate :   80 bpm   Temperature Tympanic :   97.9 DegF(Converted to: 36.6 DegC)    Oxygen Saturation :   98 %   Chastity Kuhn CMA - 3/6/2019 8:45 AM CST   Health Status   Allergies Verified? :   Yes   Medication History Verified? :   Yes   Medical History Verified? :   Yes   Pre-Visit Planning Status :   Completed   Tobacco Use? :   Never smoker   Chastity Kuhn CMA - 3/6/2019 8:45 AM CST

## 2022-02-15 NOTE — PROGRESS NOTES
Patient:   YOLA GODINEZ            MRN: [id number]            FIN: [id number]               Age:   47 years     Sex:  Female     :  1970   Associated Diagnoses:   Migraine aura, persistent   Author:   Gonzalo De La Rosa MD      Visit Information      Date of Service: 2018 08:20 am  Performing Location: [facility]  Encounter#: [id number]      Primary Care Provider (PCP):  Gonzalo De La Rosa MD    NPI# [id number]      Referring Provider:  Gonzalo De La Rosa MD    NPI# [id number]      Chief Complaint   2018 8:41 AM CDT    med refill - migraines              Additional Information:No additional information recorded during visit.   Chief complaint and symptoms as noted above and confirmed with patient.  Recent lab and diagnostic studies reviewed with patient      History of Present Illness   2014: Yola presents to clinic with worsening migraine headaches.  She says that headaches typically have a predictable or a with left-sided cheek tingling and pain followed by headache extending down into neck and back of head.  Headaches typically can last 1-2 days.  Usually are fairly responsive to higher dose ibuprofen; 800-1600 mg a day.  More recently is finding that ibuprofen has not been as effective.  Typically states that headaches occur approximately every 2 weeks.  She strictly avoids ibuprofen when not having migraine headaches.  Historically has not found Excedrin to be as helpful.  She s never tried prescription medications for her migraine.  She s also never been on any maintenance therapies. 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    2018: Yola returns for follow-up related to her migraine headaches.  Generally responding well to intermittent use of Imitrex.  Finds a clustering of symptoms typically around menses.  Feels like she may be entering perimenopausal timeframe.  Menses still regular though becoming more intense.  Overdue for women s health evaluation.  Last Pap testing .  Turning 48 later this  summer.  Describes 2 grandparents with history of colon cancer no primary degree relatives.         Review of Systems   Constitutional:  No fever, No chills.    Eye:  Negative except as documented in history of present illness.    Ear/Nose/Mouth/Throat:  Negative except as documented in history of present illness.    Respiratory:  No shortness of breath.    Cardiovascular:  No chest pain, No palpitations, No peripheral edema, No syncope.    Gastrointestinal:  No nausea, No vomiting, No abdominal pain.    Genitourinary:  No dysuria, No hematuria.    Hematology/Lymphatics:  Negative except as documented in history of present illness.    Endocrine:  No excessive thirst, No polyuria.    Immunologic:  No recurrent fevers.    Musculoskeletal:  No joint pain, No muscle pain.    Neurologic:  Alert and oriented X4, Headache, No numbness, No tingling.       Health Status   Allergies:    Allergic Reactions (Selected)  No Known Medication Allergies   Medications:  (Selected)   Prescriptions  Prescribed  SUMAtriptan 25 mg oral tablet: See Instructions, Instructions: TAKE 1 TO 2 TABLET(S) BY MOUTH ONCE AS NEEDED FOR MIGRAINE HEADACHE, # 18 tab(s), 5 Refill(s), Type: Soft Stop, Pharmacy: [facility], TAKE 1 TO 2 TABLET(S) BY MOUTH ONCE AS NEEDED FOR MIGRAINE H...  Documented Medications  Documented  Iron, Vit B complex: Iron, Vit B complex, See Instructions, Supply, 0 Refill(s), Type: Maintenance  ibuprofen: ( 800 mg ), po, q6 hrs, PRN: as needed for menstrual pain, h/a's, 0 Refill(s), Type: Maintenance   Problem list:    All Problems  Resolved: Hospitalized for pneumonia  Resolved: Pregnancy / SNOMED CT 608944650  Resolved: Pregnancy / SNOMED CT 935340265  Resolved: Pregnancy / SNOMED CT 330267653      Histories   Past Medical History:    Resolved  Hospitalized for pneumonia: Onset in 1973 at 3 years.  Resolved.  Pregnancy (840260143):  Resolved in 1993 at 22 years.  Pregnancy (593584376):  Resolved in  1996 at 25 years.  Pregnancy (154510612):  Resolved in 1998 at 27 years.   Family History:    Leukemia  Mother  Comments:  7/26/2010 12:17 PM - Mirna Harrison  CLL  Miscellaneous  Father  Comments:  8/5/2013 3:34 PM - Boubacar Meza MD  arthritis and noncancerous jaw tumor     Procedure history:    No active procedure history items have been selected or recorded.   Social History:        Alcohol Assessment: Current            Current            Current, 1-2 times per week, 2 drinks/episode average.  3 drinks/episode maximum.      Tobacco Assessment: Denies Tobacco Use      Employment and Education Assessment            Employed, Work/School description: [employer] .      Other Assessment                     Comments:                      07/27/2010 - Boubacar Meza MD                      works at [employer]        Physical Examination   vital signs stable, as noted above   Vital Signs   5/22/2018 8:41 AM CDT Temperature Tympanic 98.3 DegF    Peripheral Pulse Rate 80 bpm    Systolic Blood Pressure 120 mmHg    Diastolic Blood Pressure 80 mmHg    Mean Arterial Pressure 93 mmHg    BP Site Right arm      Measurements from flowsheet : Measurements   5/22/2018 8:41 AM CDT    Weight Measured - Standard                154.2 lb     General:  Alert and oriented, No acute distress.    Eye:  Pupils are equal, round and reactive to light, Extraocular movements are intact, Normal conjunctiva.    HENT:  Normocephalic.    Neck:  Supple, No lymphadenopathy, No thyromegaly.    Respiratory:  Lungs are clear to auscultation, Respirations are non-labored.    Cardiovascular:  Normal rate, Regular rhythm.    Gastrointestinal:  Soft.    Musculoskeletal:  Normal range of motion, Normal strength, No tenderness.    Neurologic:  Alert, Oriented, Normal motor function, No focal deficits, Cranial Nerves II-XII are grossly intact.    Cognition and Speech:  Oriented, Speech clear and coherent.    Psychiatric:  Appropriate mood &  affect.       Impression and Plan   Diagnosis     Migraine aura, persistent (AUP99-IH G43.509).         .) migraine HAs  - having success with sumatriptan 25-50mg as needed; typically occurring at time of menstuation; potential perimenopausal association  - can use NSAIDs vs. Excedrin as second-line abortive therapy  - hold off on preventative maintenance therapy for now     .) health maintenance   - encouraged establishing with women's health provider; over due for PAP/pelvic   - continue annual mammo   - will begin FIT testing once turns 48 later this year    RTC annually

## 2022-02-15 NOTE — TELEPHONE ENCOUNTER
Entered by Chastity Kuhn CMA on March 07, 2019 2:39:49 PM CST  LM for pt to return call at 1438    also, added on Ferritin and iron, TIBC's per ALFIE's request from yesterday's labs       ---------------------  From: Gonzalo De La Rosa MD   To: [facility] Message Pool ([id number]_[place]);     Sent: 3/7/2019 10:57:34 AM CST  Subject: General Message     Please let Yola know recent lab results, particularly her microcytic anemia.  I suspect mainly related to her heavy periods.  I want her to start on Vitron - C (vitamin C+ iron); Rx sent.  She can take OTC iron tabs and vitamin C if cheaper.  She should consider future follow-up with Dr. Flood if wanting to consider any hysterectomy options.  I'd also like to see her back in 6 months with repeat iron studies (order placed).Called patient and informed her of [facility]'s message. She expressed understanding. Asked her to please call back with any questions or concerns.

## 2022-02-15 NOTE — TELEPHONE ENCOUNTER
Entered by Jamar Grier CMA on April 12, 2019 11:08:48 AM CDT  ---------------------  From: Jamar Grier CMA   To: [facility]    Sent: 4/12/2019 11:08:48 AM CDT  Subject: Medication Management     ** Not Approved: Patient has requested refill too soon, Pt given #18 and 11 refills 3-6-19 **  SUMAtriptan (SUMATRIPTAN SUCCINATE 25MG TABS)  TAKE ONE TO TWO TABLETS BY MOUTH ONCE AT ONSET OF MIGRAINE HEADACHE AS NEEDED AS DIRECTED  Qty:  18 unknown unit        Days Supply:  42        Refills:  0          Substitutions Allowed     Route To Pharmacy - [facility]   Signed by Jamar Grier CMA            ------------------------------------------  From: [facility]  To: Gonzalo De aL Rosa MD  Sent: April 11, 2019 12:00:28 PM CDT  Subject: Medication Management  Due: April 12, 2019 12:00:28 PM CDT    ** On Hold Pending Signature **  Drug: SUMAtriptan (Imitrex 25 mg oral tablet)  TAKE ONE TO TWO TABLETS BY MOUTH ONCE AT ONSET OF MIGRAINE HEADACHE AS NEEDED AS DIRECTED  Quantity: 18 unknown unit  Days Supply: 42        Refills: 0  Substitutions Allowed  Notes from Pharmacy:     Dispensed Drug: SUMAtriptan (SUMAtriptan 25 mg oral tablet)  TAKE ONE TO TWO TABLETS BY MOUTH ONCE AT ONSET OF MIGRAINE HEADACHE AS NEEDED AS DIRECTED  Quantity: 18 unknown unit  Days Supply: 42        Refills: 0  Substitutions Allowed  Notes from Pharmacy:   ------------------------------------------

## 2022-02-15 NOTE — TELEPHONE ENCOUNTER
---------------------  From: Jose/Jessica NEWELL (Phone Messages Pool ([id number]_[facility]))   To: [facility] Message Pool ([id number]_[place]);     Sent: 3/16/2020 1:49:06 PM CDT  Subject: BP checks      Phone Message    PCP: DASHA    Time of Call: 2666    Phone Number: [phone]    Note: Patient called stating that she is having a hysterectomy on Wednesday and she was told to keep checking her BP as it was running a little high. Patient stated that the last 2 readings have been high. 168/108/ 171/110. Please Advise.---------------------  From: Judit Clemente CMA ([employer] Message Pool ([id number]_[facility]))   To: Gonzalo De La Rosa MD;     Sent: 3/16/2020 4:13:21 PM CDT  Subject: FW: BP checks---------------------  From: Gonzalo De La Rosa MD   To: [facility] Message Pool ([id number]_[place]);     Sent: 3/17/2020 10:02:09 AM CDT  Subject: RE: BP checks      I don't see a specific need to delay planned procedure given her low risk cardiac status.  Introduction of antihypertensive at this time would not lower this risk.  I'd like her to follow-up with us after surgery (in person or via phone).  I'll have my note in soon.contacted pt at 1043 and advised.  Asked her to continue BP monitoring and to f/u in 1-2 wks following surgery.    Will send over note when complete---------------------  From: Chastity Kuhn CMA ([employer] Message Pool ([id number]_[facility]))   To: Gonzalo De La Rosa MD;     Sent: 3/17/2020 2:18:14 PM CDT  Subject: FW: BP checks      surgery tomorrow, were you going to put an addendum onto your note?---------------------  From: Gonzalo De La Rosa MD   To: [facility] Message Pool ([id number]_[place]);     Sent: 3/17/2020 3:42:12 PM CDT  Subject: RE: BP checks      no, not necessary

## 2022-02-15 NOTE — PROGRESS NOTES
Patient:   EDITH GODINEZ            MRN: [id number]            FIN: [id number]               Age:   47 years     Sex:  Female     :  1970   Associated Diagnoses:   Family planning; Heavy menstrual period; Menstrual migraine   Author:   Tiera Leslie      Visit Information      Date of Service: 2018 12:40 pm  Performing Location: [facility]  Encounter#: [id number]      Chief Complaint   2018 12:45 PM CDT    discuss BC medication      Interval History   Concerning symptoms as listed in Chief Complaint above discussed and confirmed with patient  she and  using natural family planning  she has heavy periods and wanted more reliable method so trialed oral contraceptives  She began to have migrain with focal aura about 1 week after started oc  She stopped it a day and migraine resolved, didn't take it today and no migraine            Health Status   Allergies:    Allergic Reactions (Selected)  No Known Medication Allergies   Medications:  (Selected)   Prescriptions  Prescribed  SUMAtriptan 25 mg oral tablet: See Instructions, Instructions: TAKE 1 TO 2 TABLET(S) BY MOUTH ONCE AS NEEDED FOR MIGRAINE HEADACHE, # 18 tab(s), 5 Refill(s), Type: Soft Stop, Pharmacy: [facility], TAKE 1 TO 2 TABLET(S) BY MOUTH ONCE AS NEEDED FOR MIGRAINE H...  norethindrone 0.35 mg oral tablet: 1 tab(s) ( 0.35 mg ), PO, Daily, # 84 tab(s), 0 Refill(s), Type: Maintenance, Pharmacy: [facility], 1 tab(s) Oral daily   Problem list:    All Problems  Menstrual migraine / SNOMED CT 23154447 / Confirmed  Heavy menstrual period / SNOMED CT 5139628683 / Confirmed  Resolved: Hospitalized for pneumonia  Resolved: Pregnancy / SNOMED CT 100254128  Resolved: Pregnancy / SNOMED CT 494376540  Resolved: Pregnancy / SNOMED CT 583647049      Histories   Past Medical History:    Resolved  Hospitalized for pneumonia: Onset in  at 3 years.  Resolved.  Pregnancy (863228732):   Resolved in 1993 at 22 years.  Pregnancy (502795191):  Resolved in 1996 at 25 years.  Pregnancy (692579063):  Resolved in 1998 at 27 years.   Family History:    Leukemia  Mother  Comments:  7/26/2010 12:17 PM - Mirna Harrison  CLL  Miscellaneous  Father  Comments:  8/5/2013 3:34 PM - Boubacar Meza MD  arthritis and noncancerous jaw tumor  Diabetes  Son (Luis Fernando)     Procedure history:    No active procedure history items have been selected or recorded.   Social History:        Alcohol Assessment            Current, 1-2 times per week, 1 drinks/episode average.  2 drinks/episode maximum.      Tobacco Assessment            Never (less than 100 in lifetime)      Substance Abuse Assessment            Never      Employment and Education Assessment            Employed, Work/School description: [employer] .      Home and Environment Assessment            Marital status: .      Nutrition and Health Assessment            Type of diet: Regular.      Sexual Assessment            Sexually active: Yes.  Identifies as female, Sexual orientation: Straight or heterosexual.        Physical Examination   Vital Signs   7/2/2018 12:45 PM CDT Temperature Tympanic 97.9 DegF    Peripheral Pulse Rate 70 bpm    Pulse Site Radial artery    HR Method Manual    Systolic Blood Pressure 128 mmHg    Diastolic Blood Pressure 76 mmHg    Mean Arterial Pressure 93 mmHg    BP Site Right arm    BP Method Manual      Measurements from flowsheet : Measurements   7/2/2018 12:45 PM CDT Height Measured - Standard 64 in    Weight Measured - Standard 154.6 lb    BSA 1.78 m2    Body Mass Index 26.53 kg/m2  HI      General:  Alert and oriented, No acute distress.       Review / Management   Results review:  Lab results: 6/27/2018 4:08 PM CDT    Fecal Globin by [facility]                See comment.       Impression and Plan   Diagnosis     Family planning (QMB43-FM Z30.09).     Heavy menstrual period (VEF09-GP N92.0).     Menstrual migraine  (FWL93-HW G43.829).     Patient Instructions:       Counseled: Patient, Regarding diagnosis, Regarding treatment, Regarding medications, Verbalized understanding, 10 min in counseling  STOP oral contraceptives, advised NO ESTROGEN to be used for contraception  will start POP, use condoms, monitor if improvement in dysmenorrhea.    Orders     Orders (Selected)   Prescriptions  Prescribed  norethindrone 0.35 mg oral tablet: 1 tab(s) ( 0.35 mg ), PO, Daily, # 84 tab(s), 0 Refill(s), Type: Maintenance, Pharmacy: [facility], 1 tab(s) Oral daily.

## 2022-02-15 NOTE — TELEPHONE ENCOUNTER
Per [facility] ([phone]), no prior auth required for 6-3-20 [facility].  Email sent to [facility] [employer].

## 2022-02-15 NOTE — NURSING NOTE
Quick Intake Entered On:  6/17/2020 3:49 PM CDT    Performed On:  6/17/2020 3:49 PM CDT by Jolynn Gilliam CMA               Summary   Menstrual Status :   Hysterectomy   Height Measured :   64 in(Converted to: 5 ft 4 in, 162.56 cm)    Systolic Blood Pressure :   162 mmHg (HI)    Diastolic Blood Pressure :   100 mmHg (HI)    Mean Arterial Pressure :   121 mmHg   BP Site :   Right arm   BP Method :   Manual   Jolynn Gilliam CMA - 6/17/2020 3:49 PM CDT

## 2022-02-15 NOTE — TELEPHONE ENCOUNTER
---------------------  From: Payton Carr CMA (eRx Pool ([id number]_[facility]))   To: [facility] Message Pool ([id number]_[place]);     Sent: 3/23/2020 11:13:44 AM CDT  Subject: FW: Medication Management   Due Date/Time: 3/24/2020 10:49:00 AM CDT       Medication Refill needing approval    PCP:   [facility]    Medication:   sumatriptan  Last Filled:  3/6/19    Quantity:  18  Refills:  11  CSA on file?   n/a     Date of last office visit and reason:   3/10/20; preop  Date of last labs pertaining to condition:  3/10/20    Return to Clinic order placed?  yes; November      ------------------------------------------  From: [facility]  To: Gonzalo De La Rosa MD  Sent: March 23, 2020 10:49:31 AM CDT  Subject: Medication Management  Due: March 24, 2020 10:49:31 AM CDT    ** On Hold Pending Signature **  Drug: SUMAtriptan (SUMAtriptan 25 mg oral tablet)  TAKE ONE TO TWO TABLETS BY MOUTH ONCE AT ONSET OF MIGRAINE HEADACHE AS NEEDED AS DIRECTED  Quantity: 18 unknown unit  Days Supply: 42  Refills: 11  Substitutions Allowed  Notes from Pharmacy:     Dispensed Drug: SUMAtriptan (SUMAtriptan 25 mg oral tablet)  TAKE ONE TO TWO TABLETS BY MOUTH ONCE AT ONSET OF MIGRAINE HEADACHE AS NEEDED AS DIRECTED  Quantity: 18 unknown unit  Days Supply: 42  Refills: 11  Substitutions Allowed  Notes from Pharmacy:   ---------------------------------------------------------------  From: Chastity Kuhn CMA   To: [facility]    Sent: 3/24/2020 11:23:08 AM CDT  Subject: FW: Medication Management     ** Submitted: **  Order:SUMAtriptan (SUMAtriptan 25 mg oral tablet)  See Instructions  TAKE ONE TO TWO TABLETS BY MOUTH ONCE AT ONSET OF MIGRAINE HEADACHE AS NEEDED AS DIRECTED  Qty:  18 unknown unit        Days Supply:  42        Refills:  3          Substitutions Allowed     Route To Pharmacy - [facility]    Signed by Chastity Kuhn CMA  3/24/2020 4:22:00 PM    ** Submitted:  **  Complete:SUMAtriptan (SUMAtriptan 25 mg oral tablet)   Signed by Chastity Kuhn CMA  3/24/2020 4:23:00 PM    ** Not Approved:  **  SUMAtriptan (SUMATRIPTAN SUCCINATE 25MG TABS)  TAKE ONE TO TWO TABLETS BY MOUTH ONCE AT ONSET OF MIGRAINE HEADACHE AS NEEDED AS DIRECTED  Qty:  18 unknown unit        Days Supply:  42        Refills:  11          Substitutions Allowed     Route To Pharmacy - [facility]   Signed by Chastity Kuhn CMA

## 2022-02-15 NOTE — TELEPHONE ENCOUNTER
---------------------  From: Abimbola Collazo MA (eRx Pool ([id number]_[facility]))   To: [facility] Adeline Pool ([id number]_[place]);     Sent: 3/4/2019 11:46:12 AM CST  Subject: FW: Medication Management   Due Date/Time: 3/3/2019 5:24:00 PM CST     Last visit:  8/22/18 GYN with Flood  Last refilled:  5/22/18  #18 and 5 refills  RTC due 5/22/19      ------------------------------------------  From: [facility]  To: Gonzalo De La Rosa MD  Sent: March 2, 2019 5:24:10 PM CST  Subject: Medication Management  Due: March 3, 2019 5:24:10 PM CST    ** On Hold Pending Signature **  Drug: SUMAtriptan (SUMAtriptan 25 mg oral tablet)  TAKE ONE TO TWO TABLETS BY MOUTH ONCE AT ONSET OF MIGRAINE HEADACHE AS NEEDED AS DIRECTED  Quantity: 18 unknown unit  Days Supply: 42        Refills: 5  Substitutions Allowed  Notes from Pharmacy:     Dispensed Drug: SUMAtriptan (SUMAtriptan 25 mg oral tablet)  TAKE ONE TO TWO TABLETS BY MOUTH ONCE AT ONSET OF MIGRAINE HEADACHE AS NEEDED AS DIRECTED  Quantity: 18 unknown unit  Days Supply: 42        Refills: 5  Substitutions Allowed  Notes from Pharmacy:   ---------------------------------------------------------------  From: Chastity Kuhn CMA   To: [facility]    Sent: 3/5/2019 9:50:25 AM CST  Subject: FW: Medication Management     ** Submitted: **  Order:SUMAtriptan (SUMAtriptan 25 mg oral tablet)  See Instructions  TAKE ONE TO TWO TABLETS BY MOUTH ONCE AT ONSET OF MIGRAINE HEADACHE AS NEEDED AS DIRECTED  Qty:  18 unknown unit        Days Supply:  42        Refills:  0          Substitutions Allowed     Route To Pharmacy - [facility]    Signed by Chastity Kuhn CMA  3/5/2019 9:50:00 AM    ** Submitted: **  Complete:SUMAtriptan (SUMAtriptan 25 mg oral tablet)   Signed by Chastity Kuhn CMA  3/5/2019 9:50:00 AM    ** Not Approved:  **  SUMAtriptan (SUMATRIPTAN SUCCINATE 25MG TABS)  TAKE ONE TO TWO TABLETS BY MOUTH ONCE AT ONSET OF  MIGRAINE HEADACHE AS NEEDED AS DIRECTED  Qty:  18 unknown unit        Days Supply:  42        Refills:  5          Substitutions Allowed     Route To Pharmacy - [facility]   Signed by Chastity Kuhn CMA tomorrow

## 2022-02-15 NOTE — NURSING NOTE
Patient is scheduled for lap assisted vaginal hysterectomy with bilateral salpingo-oophorectomy with Dr. Flood at [facility] on 6/3/2020. Patient has been given verbal and written pre op instructions including no NSAIDs 7 days prior, NPO after midnight prior, fleet enema morning of 1 -2  hours prior to arrival at hospital. NPV 6 weeks post op, No heavy lifting more than 7 lb until post op clearance per Remigio, [facility] will call 1-2 days prior to surgery with pre op call. [facility] surgery center brochure and Hysterectomy Q and A print out have been given. Patient advised to schedule pre op physical 1 week prior. Discuss pre op covid testing.  All questions are answered.Call to patient given contact information to schedule pre op covid testing at [facility] per their new policy.

## 2022-02-15 NOTE — NURSING NOTE
Depression Screening Entered On:  7/13/2021 2:57 PM CDT    Performed On:  7/13/2021 2:57 PM CDT by Chastity Kuhn CMA               Depression Screening   Little Interest - Pleasure in Activities :   Not at all   Feeling Down, Depressed, Hopeless :   Not at all   Initial Depression Screen Score :   0 Score   Poor Appetite or Overeating :   Not at all   Trouble Falling or Staying Asleep :   Not at all   Feeling Tired or Little Energy :   Not at all   Feeling Bad About Yourself :   Not at all   Trouble Concentrating :   Not at all   Moving or Speaking Slowly :   Not at all   Thoughts Better Off Dead or Hurting Self :   Not at all   Difficulty at Work, Home, Getting Along :   Not difficult at all   Detailed Depression Screen Score :   0    Total Depression Screen Score :   0    Chastity Kuhn CMA - 7/13/2021 2:57 PM CDT

## 2022-02-15 NOTE — NURSING NOTE
Patient is scheduled for lap assisted vaginal hysterectomy with bilateral salpingo-oophorectomy with Dr. Flood at [facility] on 3/18/2020. Patient is given verbal and written pre op instructions including no NSAIDs 7 days prior, NPO after midnight prior, fleet enema morning of 1 -2  hours prior to arrival at hospital. NPV 6 weeks post op, No heavy lifting more than 7 lb until post op clearance per Remigio, [facility] will call 1-2 days prior to surgery with pre op call. [facility] surgery center brochure and Hysterectomy Q and A print out given. Patient advised to schedule pre op physical 1 week prior. All questions are answered.

## 2022-02-15 NOTE — TELEPHONE ENCOUNTER
---------------------  From: Jolynn Gilliam CMA   Sent: 6/17/2020 3:52:53 PM CDT  Subject: elevated bp     Pt given bp diary - she has her own machine at home. She will monitor and document her readings. Hoping that once her body adjusts more post hysterectomy it will get better. If no improvement, continued rising numbers, or not feeling well, she will sched appt with [facility] to discuss.

## 2022-02-15 NOTE — LETTER
(Inserted Image. Unable to display)   March 08, 2019      EDITH GODINEZ   [place], [place] [id number]        Dear EDITH,     Thank you for selecting [facility] (previously [facility]) for your healthcare needs. Below you will find the results of your recent test(s) done at our clinic.     Labs confirm significant iron deficiency anemia.  I'd like to see how you response to oral iron replacement therapy (either Vitron-C) vs. using oral iron and vitamin C in separate preparations.  We should pursue colorectal cancer screening for this, but I'm more suspicious your iron losses are explained by your period cycles.        Result Name Current Result Previous Result Reference Range   Glucose Level (mg/dL)  87 3/6/2019  84 8/11/2017 65 - 99   Cholesterol (mg/dL) ((H)) 222 3/6/2019 ((H)) 222 8/11/2017  - <200   Non-HDL Cholesterol ((H)) 178 3/6/2019 ((H)) 175 8/11/2017  - <130   HDL (mg/dL) ((L)) 44 3/6/2019  47 8/11/2017 >50 -    Cholesterol/HDL Ratio ((H)) 5.0 3/6/2019  4.7 8/11/2017  - <5.0   LDL ((H)) 139 3/6/2019 ((H)) 146 8/11/2017    Triglyceride (mg/dL) ((H)) 239 3/6/2019  144 8/11/2017  - <150   Iron Level (mcg/dL) ((L)) 24 3/6/2019  45 - 160   TIBC ((H)) 453 3/6/2019  250 - 450   Iron Saturation ((L)) 5 3/6/2019  11 - 50   Ferritin (ng/mL) ((L)) 5 3/6/2019  10 - 232   WBC  5.3 3/6/2019  3.8 - 10.8   RBC  4.16 3/6/2019  3.80 - 5.10   Hgb (gm/dL) ((L)) 10.2 3/6/2019  11.7 - 15.5   Hct (%) ((L)) 32.6 3/6/2019  35.0 - 45.0   MCV (fL) ((L)) 78.4 3/6/2019  80.0 - 100.0   MCH (pg) ((L)) 24.5 3/6/2019  27.0 - 33.0   MCHC (gm/dL) ((L)) 31.3 3/6/2019  32.0 - 36.0   RDW (%) ((H)) 15.4 3/6/2019  11.0 - 15.0   Platelet  362 3/6/2019  140 - 400   MPV (fL)  11.4 3/6/2019  7.5 - 12.5   Test in Question - Test(s) Ordered   FERRITIN IRON AND TOTAL IRON 3/6/2019     Misc Test CPT Code   457SB 7573SB 3/6/2019     Misc Test Client Contact  ROBBI ADAMS 3/6/2019     [facility] Test Comment   See comment 3/6/2019  See comment 3/6/2019    Misc Test Comment  See comment 3/6/2019  See comment 3/6/2019        Please contact me or my assistant at [phone] if you have any questions or concerns.     Sincerely,        Gonzalo De La Rosa MD    What do your labs mean?  Below is a glossary of commonly ordered labs:  LDL - Bad Cholesterol  HDL - Good Cholesterol  AST/ALT - Liver Function  Cr/Creatinine - Kidney Function  Microalbumin - Kidney Function  BUN - Kidney Function  PSA - Prostate   TSH - Thyroid Hormone  HgbA1c - Diabetes Test  Hgb (Hemoglobin) - Red Blood Cells

## 2022-02-15 NOTE — TELEPHONE ENCOUNTER
Entered by Chastity Kuhn CMA on December 11, 2020 11:51:46 AM CST  LM for return call at 1151  await c/b to send Rx       ---------------------  From: Gonzalo De La Rosa MD   To: [facility] [place] Message Pool ([id number] - [place]);     Sent: 12/11/2020 10:59:59 AM CST  Subject: General Message     Please let Yola know about her recent labs, specifically her TSH being elevated at 16.  I sent Rx for levothyroxine 50mcg daily.  Plans to recheck TSH at follow up visit in 6 months.Pt returned call and was informed of above. Rx was already sent in.

## 2022-02-15 NOTE — PROGRESS NOTES
Patient:   YOLA GODINEZ            MRN: [id number]            FIN: [id number]               Age:   51 years     Sex:  Female     :  1970   Associated Diagnoses:   Hypertension; Hypothyroid   Author:   Gonzalo De La Rosa MD      Visit Information      Date of Service: 2021 08:54 am  Performing Location: [facility]  Encounter#: [id number]      Primary Care Provider (PCP):  Gonzalo De La Rosa MD    NPI# [id number]      Referring Provider:  Gonzalo De La Rosa MD    NPI# [id number]      Chief Complaint   2021 9:16 AM CST   FMLA  for mom              Additional Information:No additional information recorded during visit.   Chief complaint and symptoms as noted above and confirmed with patient.  Recent lab and diagnostic studies reviewed with patient      History of Present Illness   2021: Yola presents to clinic for completion of FMLA paperwork related to her mother s care.  Her mother had recently been hospitalized at [facility] and subsequently discharged to [facility].  Due to facility Covid outbreak, her family prefers her to be at home for now.  Yola provides recordings of her blood pressures.         Review of Systems   Constitutional:  No fever, No chills.    Eye:  Negative except as documented in history of present illness.    Ear/Nose/Mouth/Throat:  Negative except as documented in history of present illness.    Respiratory:  No shortness of breath.    Cardiovascular:  No chest pain, No palpitations.    Gastrointestinal   Genitourinary   Hematology/Lymphatics:  Negative except as documented in history of present illness.    Endocrine   Immunologic   Musculoskeletal   Neurologic:  Alert and oriented X4.       Health Status   Allergies:    Allergic Reactions (Selected)  No Known Medication Allergies   Medications:  (Selected)   Prescriptions  Prescribed  SUMAtriptan 25 mg oral tablet: See Instructions, Instructions: TAKE ONE TO TWO TABLETS BY MOUTH ONCE AT ONSET OF MIGRAINE HEADACHE AS  NEEDED AS DIRECTED, # 18 unknown unit, 3 Refill(s), Type: Soft Stop, Pharmacy: On license of [facility], TAKE ONE TO TWO TABLETS BY MOUTH...  Vitron-C 125 mg-65 mg oral tablet: 1 tab(s), Oral, daily, # 90 tab(s), 3 Refill(s), Type: Maintenance, Pharmacy: On license of [facility], 1 tab(s) Oral daily  amitriptyline 50 mg oral tablet: = 1 tab(s) ( 50 mg ), Oral, hs, # 90 tab(s), 3 Refill(s), Type: Maintenance, Pharmacy: On license of [facility], note dose change, 1 tab(s) Oral hs, 64, in, 12/09/20 8:18:00 CST, Height Measured, 164.6, lb, 07/13/21 11:21:00 CDT, Weight Camille...  estradiol 1 mg oral tablet: = 1 tab(s) ( 1 mg ), Oral, every other day, # 45 tab(s), 1 Refill(s), Type: Maintenance, Pharmacy: On license of [facility], 1 tab(s) Oral every other day, 64, in, 12/09/20 8:18:00 CST, Height Measured, 164.6, lb, 07/13/21 11:21:00 CDT, Cole...  hydrochlorothiazide-losartan 25 mg-100 mg oral tablet: 1 tab(s), Oral, daily, # 90 tab(s), 3 Refill(s), Type: Maintenance, Pharmacy: On license of [facility], 1 tab(s) Oral daily, 64, in, 12/09/20 8:18:00 CST, Height Measured, 164.6, lb, 07/13/21 11:21:00 CDT, Weight Measured  levothyroxine 100 mcg (0.1 mg) oral tablet: = 1 tab(s) ( 100 mcg ), Oral, daily, # 90 tab(s), 3 Refill(s), Type: Maintenance, Pharmacy: On license of [facility], 1 tab(s) Oral daily, 64, in, 12/09/20 8:18:00 CST, Height Measured, 164.6, lb, 07/13/21 11:21:00 CDT, Weight Measured,    Medications          *denotes recorded medication          SUMAtriptan 25 mg oral tablet: See Instructions, TAKE ONE TO TWO TABLETS BY MOUTH ONCE AT ONSET OF MIGRAINE HEADACHE AS NEEDED AS DIRECTED, 18 unknown unit, 3 Refill(s).          amitriptyline 50 mg oral tablet: 50 mg, 1 tab(s), Oral, hs, 90 tab(s), 3 Refill(s).          Vitron-C 125 mg-65 mg oral tablet: 1 tab(s), Oral, daily, 90 tab(s), 3 Refill(s).          estradiol 1 mg oral tablet: 1 mg, 1 tab(s),  Oral, every other day, 45 tab(s), 1 Refill(s).          hydrochlorothiazide-losartan 25 mg-100 mg oral tablet: 1 tab(s), Oral, daily, 90 tab(s), 3 Refill(s).          levothyroxine 100 mcg (0.1 mg) oral tablet: 100 mcg, 1 tab(s), Oral, daily, 90 tab(s), 3 Refill(s).       Problem list:    All Problems  Heavy menstrual period / SNOMED CT 6759395722 / Confirmed  Menstrual migraine / SNOMED CT 31927064 / Confirmed  Resolved: Hospitalized for pneumonia  Resolved: Pregnancy / SNOMED CT 571232679  Resolved: Pregnancy / SNOMED CT 276949678  Resolved: Pregnancy / SNOMED CT 764571671      Histories   Past Medical History:    Resolved  Hospitalized for pneumonia: Onset in 1973 at 3 years.  Resolved.  Pregnancy (296625531):  Resolved in 1993 at 22 years.  Pregnancy (037693939):  Resolved in 1996 at 25 years.  Pregnancy (623107310):  Resolved in 1998 at 27 years.   Family History:    Leukemia  Mother  Comments:  7/26/2010 12:17 PM CDT - Mirna Harrison  CLL  Heart disease  Mother  Miscellaneous  Father  Comments:  8/5/2013 3:34 PM CDT - Boubacar Meza MD  arthritis and noncancerous jaw tumor  Diabetes  Son (Luis Fernando)     Procedure history:    Laparoscopy assisted vaginal hysterectomy with bilateral salpingo-oophorectomy (3376144799) on 6/3/2020 at 49 Years.  Screening for malignant neoplasm of colon (6150152514) on 4/14/2020 at 49 Years.  Comments:  4/21/2020 8:40 AM CDT - Velvet Duke MA  Recommendation:  f/u 3yrs    4/20/2020 11:35 AM CDT - Annita Leung - Negative   Social History:        Electronic Cigarette/Vaping Assessment            Electronic Cigarette Use: Never.      Alcohol Assessment            Current, 1-2 times per week, 1 drinks/episode average.  2 drinks/episode maximum.      Tobacco Assessment            Never (less than 100 in lifetime)            Never (less than 100 in lifetime)      Substance Abuse Assessment            Never      Employment and Education Assessment            Employed,  Work/School description: [employer] .      Home and Environment Assessment            Marital status: .      Nutrition and Health Assessment            Type of diet: Regular.      Sexual Assessment            Sexually active: Yes.  Identifies as female, Sexual orientation: Straight or heterosexual.        Physical Examination   vital signs stable, as noted above   Vital Signs   11/24/2021 9:16 AM CST Temperature Tympanic 98.2 DegF    Peripheral Pulse Rate 102 bpm  HI    Systolic Blood Pressure 118 mmHg    Diastolic Blood Pressure 81 mmHg  HI    Mean Arterial Pressure 93 mmHg      Measurements from flowsheet : Measurements   11/24/2021 9:16 AM CST   Weight Measured - Standard                164.9 lb     General:  Alert and oriented, No acute distress.    Eye:  Extraocular movements are intact.    HENT:  Normocephalic.    Respiratory:  Respirations are non-labored.    Cardiovascular:  Normal rate.    Neurologic:  Alert, Oriented.    Cognition and Speech:  Oriented, Speech clear and coherent.    Psychiatric:  Appropriate mood & affect.       Review / Management   Results review      Impression and Plan   Diagnosis     Hypertension (XMG93-DE I10).     Hypothyroid (UQU75-AC E03.9).       .) FMLA paperwork  - completed forms in patient's presence with her specific input related to her mother's severe health condition.  [facility].  She currently has home health services in place.  Yola needing to be available for home cars, specifically medication needs, and transportation to doctor's appointments      Professional Services   Counseling Summary:  This was a 15 minute visit with greater than 50% of that time spent counseling the patient.

## 2022-02-15 NOTE — NURSING NOTE
Comprehensive Intake Entered On:  11/24/2021 9:22 AM CST    Performed On:  11/24/2021 9:16 AM CST by Chastity Kuhn CMA               Summary   Chief Complaint :   FMLA  for mom   Menstrual Status :   Hysterectomy   Weight Measured :   164.9 lb(Converted to: 164 lb 14 oz, 74.797 kg)    Systolic Blood Pressure :   118 mmHg   Diastolic Blood Pressure :   81 mmHg (HI)    Mean Arterial Pressure :   93 mmHg   Peripheral Pulse Rate :   102 bpm (HI)    Temperature Tympanic :   98.2 DegF(Converted to: 36.8 DegC)    Chastity Kuhn CMA - 11/24/2021 9:16 AM CST   Health Status   Allergies Verified? :   Yes   Medication History Verified? :   Yes   Medical History Verified? :   Yes   Pre-Visit Planning Status :   Completed   Tobacco Use? :   Never smoker   Chastity Kuhn CMA - 11/24/2021 9:16 AM CST   Consents   Consent for Immunization Exchange :   Consent Granted   Consent for Immunizations to Providers :   Consent Granted   Chastity Kuhn CMA - 11/24/2021 9:16 AM CST   Family History   Family History   (As Of: 11/24/2021 9:22:15 AM CST)   Mother:   Relation:   Mother ; Gender:   Female ;  YOB: 1946 12:00:00 AM CST            Nomenclature:   Leukemia ; Comments:   7/26/2010 12:17 PM Mirna Flynn  CLL ; Value:   Positive            Nomenclature:   Heart disease ; Value:   Positive          Father:   Relation:   Father ; Gender:   Male ;  YOB: 1941 12:00:00 AM CST            Nomenclature:   Miscellaneous ; Comments:   8/5/2013 3:34 PM Boubacar Deluca MD  arthritis and noncancerous jaw tumor ; Value:   Positive          Sister: Yadira  Full Name:   Yadira ; Relation:   Sister ; Gender:   Female ;  YOB: 1970 12:00:00 AM CST ; Negative History          Brother: Dom  Full Name:   Dom ; Relation:   Brother ; Gender:   Male ;  YOB: 1974 12:00:00 AM CST ; Negative History          Sister: Kyra  Full Name:   Kyra ; Relation:   Sister ; Gender:   Female ;  Birth Date:    1/1/1973 12:00:00 AM CST ; Negative History          Brother: Gonzalo  Full Name:   Gonzalo ; Relation:   Brother ; Gender:   Male ;  YOB: 1975 12:00:00 AM CST ; Negative History          Brother: Fabian  Full Name:   Fabian ; Relation:   Brother ; Gender:   Male ;  YOB: 1981 12:00:00 AM CST ; Negative History          Son: Luis Fernando  Full Name:   Luis Fernando ; Relation:   Son ; Gender:   Male ;  YOB: 1998 12:00:00 AM CST            Nomenclature:   Diabetes ; Value:   Positive            Social History   Social History   (As Of: 11/24/2021 9:22:15 AM CST)   Alcohol:        Current, 1-2 times per week, 1 drinks/episode average.  2 drinks/episode maximum.   (Last Updated: 6/18/2018 8:50:12 AM CDT by Elda Garnica)          Tobacco:        Never (less than 100 in lifetime)   (Last Updated: 6/18/2018 8:50:26 AM CDT by Elda Garnica)   Never (less than 100 in lifetime)   (Last Updated: 12/9/2020 8:19:14 AM CST by Chastity Kuhn CMA)          Electronic Cigarette/Vaping:        Electronic Cigarette Use: Never.   (Last Updated: 12/9/2020 8:19:10 AM CST by Chastity Kuhn CMA)          Substance Abuse:        Never   (Last Updated: 6/18/2018 8:50:29 AM CDT by Elda Garnica)          Employment/School:        Employed, Work/School description: [employer] .   (Last Updated: 7/27/2010 9:23:35 AM CDT by Boubacar Meza MD)          Home/Environment:        Marital status: .   (Last Updated: 6/18/2018 8:50:37 AM CDT by Elda Garnica)          Nutrition/Health:        Type of diet: Regular.   (Last Updated: 6/18/2018 8:50:43 AM CDT by Elda Garnica)          Sexual:        Sexually active: Yes.  Identifies as female, Sexual orientation: Straight or heterosexual.   (Last Updated: 6/18/2018 8:50:56 AM CDT by Elda Garnica)

## 2022-02-15 NOTE — TELEPHONE ENCOUNTER
---------------------  From: Karolina Choi LPN (Phone Messages Pool ([id number]_[facility]))   To: [facility] Message Pool ([id number]_[place] - [place]);     Sent: 7/9/2019 11:56:36 AM CDT  Subject: amitriptyline        Phone Message    PCP:   [id number]      Time of Call:  1043       Person Calling:  Patient   Phone number:  [phone]     Returned call at:     Note:   Patient is calling to see if [facility] will refill amitriptyline for her migraines if it is working great for her or if she needs another appt. for further  refills  RTC is for labs in September.  Please advise.    Last office visit and reason:  03/06/2019 migraines, Px

## 2022-02-15 NOTE — PROGRESS NOTES
Patient:   YOLA GODINEZ            MRN: [id number]            FIN: [id number]               Age:   50 years     Sex:  Female     :  1970   Associated Diagnoses:   Migraine aura, persistent; Anemia, iron deficiency; Menorrhagia   Author:   Gonzalo De La Rosa MD      Visit Information      Date of Service: 2021 11:14 am  Performing Location: [facility]  Encounter#: [id number]      Primary Care Provider (PCP):  Gonzalo De La Rosa MD    NPI# [id number]      Referring Provider:  Gonazlo De La Rosa MD    NPI# [id number]      Chief Complaint   2021 11:21 AM CDT   f/u thyroid/B< - review labs              Additional Information:No additional information recorded during visit.   Chief complaint and symptoms as noted above and confirmed with patient.  Recent lab and diagnostic studies reviewed with patient      History of Present Illness   2014: Yola presents to clinic with worsening migraine headaches.  She says that headaches typically have a predictable or a with left-sided cheek tingling and pain followed by headache extending down into neck and back of head.  Headaches typically can last 1-2 days.  Usually are fairly responsive to higher dose ibuprofen; 800-1600 mg a day.  More recently is finding that ibuprofen has not been as effective.  Typically states that headaches occur approximately every 2 weeks.  She strictly avoids ibuprofen when not having migraine headaches.  Historically has not found Excedrin to be as helpful.  She s never tried prescription medications for her migraine.  She s also never been on any maintenance therapies. 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    2020:  Yola presents to clinic for follow-up related to hypertension.  During her preoperative phase for recent hysterectomy her blood pressures remain elevated.  She has been monitoring since that time and brings in home blood pressure log for review.  Consistently she has systolic blood pressures in the 150s and 160s.  No associated  symptoms.  Tolerate oral iron poorly; currently taking every other day.  Feels well.  No complaints of fatigue.  12/9/2020: Yola returns for follow-up.  Overall doing well.  No issues since her hysterectomy.  She is on estradiol since procedure.  Was started on losartan hydrochlorothiazide at time of her last visit.  Has tolerated well.  Not monitoring blood pressures at home.  Increased stressors this year including the pandemic as well as caring for her mother after quadruple bypass surgery this summer.  Doing well with maintenance amitriptyline use.  7/13/2021: Yola presents for physical.  Doing well.  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  Feels like her headaches have been doing well with estrogen replacement.  Has tried to reduce estrogen now to every other day and as he is seen no real ill effects.  Reviewed recent labs demonstrating persistently elevated TSH.  Did have a elevated fasting blood sugar of 107.  No significant family history of diabetes though does have a son with type 1 diabetes.         Review of Systems   Constitutional:  No fever, No chills.    Eye:  Negative except as documented in history of present illness.    Ear/Nose/Mouth/Throat:  Negative except as documented in history of present illness.    Respiratory:  No shortness of breath.    Cardiovascular:  No chest pain, No palpitations, No peripheral edema, No syncope.    Gastrointestinal:  No nausea, No vomiting, No constipation, No abdominal pain.    Genitourinary:  No dysuria, No hematuria.    Gynecologic   Hematology/Lymphatics:  Negative except as documented in history of present illness.    Endocrine   Immunologic:  No recurrent fevers.    Musculoskeletal:  No joint pain, No muscle pain.    Neurologic:  Alert and oriented X4, Headache, No numbness, No tingling.       Health Status   Allergies:    Allergic Reactions (Selected)  No Known Medication Allergies   Medications:  (Selected)    Prescriptions  Prescribed  SUMAtriptan 25 mg oral tablet: See Instructions, Instructions: TAKE ONE TO TWO TABLETS BY MOUTH ONCE AT ONSET OF MIGRAINE HEADACHE AS NEEDED AS DIRECTED, # 18 unknown unit, 3 Refill(s), Type: Soft Stop, Pharmacy: [facility], TAKE ONE TO TWO TABLETS BY MOUTH...  Vitron-C 125 mg-65 mg oral tablet: 1 tab(s), Oral, daily, # 90 tab(s), 3 Refill(s), Type: Maintenance, Pharmacy: [facility], 1 tab(s) Oral daily  amitriptyline 50 mg oral tablet: = 1 tab(s) ( 50 mg ), Oral, hs, # 90 tab(s), 3 Refill(s), Type: Maintenance, Pharmacy: [facility], note dose change, 1 tab(s) Oral hs, 64, in, 12/09/20 8:18:00 CST, Height Measured, 154.12, lb, 12/09/20 8:18:00 CST, Weight Camille...  estradiol 1 mg oral tablet: = 1 tab(s) ( 1 mg ), Oral, every other day, # 45 tab(s), 1 Refill(s), Type: Maintenance, Pharmacy: [facility], 1 tab(s) Oral every other day, 64, in, 12/09/20 8:18:00 CST, Height Measured, 164.6, lb, 07/13/21 11:21:00 CDT, Cole...  hydrochlorothiazide-losartan 25 mg-100 mg oral tablet: 1 tab(s), Oral, daily, # 90 tab(s), 3 Refill(s), Type: Maintenance, Pharmacy: [facility], 1 tab(s) Oral daily, 64, in, 12/09/20 8:18:00 CST, Height Measured, 154.12, lb, 12/09/20 8:18:00 CST, Weight Measured  levothyroxine 100 mcg (0.1 mg) oral tablet: = 1 tab(s) ( 100 mcg ), Oral, daily, # 90 tab(s), 3 Refill(s), Type: Maintenance, Pharmacy: [facility], 1 tab(s) Oral daily, 64, in, 12/09/20 8:18:00 CST, Height Measured, 164.6, lb, 07/13/21 11:21:00 CDT, Weight Measured,    Medications          *denotes recorded medication          SUMAtriptan 25 mg oral tablet: See Instructions, TAKE ONE TO TWO TABLETS BY MOUTH ONCE AT ONSET OF MIGRAINE HEADACHE AS NEEDED AS DIRECTED, 18 unknown unit, 3 Refill(s).          amitriptyline 50 mg oral tablet: 50 mg, 1 tab(s), Oral, hs, 90  tab(s), 3 Refill(s).          Vitron-C 125 mg-65 mg oral tablet: 1 tab(s), Oral, daily, 90 tab(s), 3 Refill(s).          estradiol 1 mg oral tablet: 1 mg, 1 tab(s), Oral, every other day, 45 tab(s), 1 Refill(s).          hydrochlorothiazide-losartan 25 mg-100 mg oral tablet: 1 tab(s), Oral, daily, 90 tab(s), 3 Refill(s).          levothyroxine 100 mcg (0.1 mg) oral tablet: 100 mcg, 1 tab(s), Oral, daily, 90 tab(s), 3 Refill(s).       Problem list:    All Problems  Heavy menstrual period / SNOMED CT 9577341612 / Confirmed  Menstrual migraine / SNOMED CT 45576844 / Confirmed  Resolved: Hospitalized for pneumonia  Resolved: Pregnancy / SNOMED CT 300001083  Resolved: Pregnancy / SNOMED CT 891037516  Resolved: Pregnancy / SNOMED CT 092106659      Histories   Past Medical History:    Resolved  Hospitalized for pneumonia: Onset in 1973 at 3 years.  Resolved.  Pregnancy (085512977):  Resolved in 1993 at 22 years.  Pregnancy (950720159):  Resolved in 1996 at 25 years.  Pregnancy (986232412):  Resolved in 1998 at 27 years.   Family History:    Leukemia  Mother  Comments:  7/26/2010 12:17 PM CDT - Mirna Harrison  CLL  Miscellaneous  Father  Comments:  8/5/2013 3:34 PM CDT - Boubacar Meza MD  arthritis and noncancerous jaw tumor  Diabetes  Son (Luis Fernando)     Procedure history:    Laparoscopy assisted vaginal hysterectomy with bilateral salpingo-oophorectomy (9133234637) on 6/3/2020 at 49 Years.  Screening for malignant neoplasm of colon (7788774711) on 4/14/2020 at 49 Years.  Comments:  4/21/2020 8:40 AM CDT - Velvet Duke MA  Recommendation:  f/u 3yrs    4/20/2020 11:35 AM CDT - Annita Leung - Negative   Social History:        Electronic Cigarette/Vaping Assessment            Electronic Cigarette Use: Never.      Alcohol Assessment            Current, 1-2 times per week, 1 drinks/episode average.  2 drinks/episode maximum.      Tobacco Assessment            Never (less than 100 in lifetime)             Never (less than 100 in lifetime)      Substance Abuse Assessment            Never      Employment and Education Assessment            Employed, Work/School description: [employer] .      Home and Environment Assessment            Marital status: .      Nutrition and Health Assessment            Type of diet: Regular.      Sexual Assessment            Sexually active: Yes.  Identifies as female, Sexual orientation: Straight or heterosexual.        Physical Examination   vital signs stable, as noted above   Vital Signs   7/13/2021 11:23 AM CDT Systolic Blood Pressure 131 mmHg  HI    Diastolic Blood Pressure 88 mmHg  HI    Mean Arterial Pressure 102 mmHg   7/13/2021 11:21 AM CDT Temperature Tympanic 97.6 DegF  LOW    Peripheral Pulse Rate 85 bpm    Systolic Blood Pressure 137 mmHg  HI    Diastolic Blood Pressure 94 mmHg  HI    Mean Arterial Pressure 108 mmHg      Measurements from flowsheet : Measurements   7/13/2021 11:21 AM CDT   Weight Measured - Standard                164.6 lb     General:  Alert and oriented, No acute distress.    Eye:  Pupils are equal, round and reactive to light, Extraocular movements are intact.    HENT:  Normocephalic.    Neck:  Supple, Non-tender.    Respiratory:  Lungs are clear to auscultation, Respirations are non-labored, Breath sounds are equal.    Cardiovascular:  Normal rate, Regular rhythm, No murmur, No edema.    Gastrointestinal:  Soft, Non-tender, Non-distended.    Neurologic:  Alert, Oriented, Normal motor function, No focal deficits.    Cognition and Speech:  Oriented, Speech clear and coherent.    Psychiatric:  Appropriate mood & affect.       Review / Management   Results review:  Lab results   7/8/2021 8:09 AM CDT Sodium Level 139 mmol/L    Potassium Level 3.9 mmol/L    Chloride Level 100 mmol/L    CO2 Level 29 mmol/L    Glucose Level 107 mg/dL  HI    BUN 12 mg/dL    Creatinine 0.90 mg/dL    BUN/Creat Ratio NOT APPLICABLE    eGFR 74 mL/min/1.73m2    eGFR  African American 85 mL/min/1.73m2    Calcium Level 9.7 mg/dL    Cholesterol 239 mg/dL  HI    Non-  HI    HDL 55 mg/dL    Chol/HDL Ratio 4.3      HI    Triglyceride 295 mg/dL  HI    TSH 12.79 mIU/L  HI   .       Impression and Plan   Diagnosis     Migraine aura, persistent (JNW68-UX G43.509).     Anemia, iron deficiency (YPU10-NZ D50.9).     Menorrhagia (RVJ25-IN N92.0).         .) hypertension, uncontrolled (though improving)  current antihypertensive regimen: losartan/hctz 100/25mg daily  regimen changes: none  intolerance:  future titration/work-up plan:   - goals to normalize thyroid axis and discussed lifestyle modification    .) s/p abdominal hysterectomy, 6/2020, Dr. Flood  - doing well    .) migraine HAs  - noticeably improved since SAL[facility]  - having success with sumatriptan 25-50mg as needed  - good response since intro of amitriptyline 50mg qhs    .) h/o iron deficiency anemia; related to h/o menorrhagia - since resolved    .) hypothyroidism   - on levothyroxine 50mcg; recheck TSH still elevated at 12   - increasing levothyroxine to 100mcg daily    .) health maintenance   - TAHBSO   - continue annual mammo   - elevated FBS; 107   - dyslipidemia - moderately elevated LDL, triglycerides   - Cologuard negative '20; repeat in '23   - completed COVID vaccination      recheck TSH in 4 months  RTC annually

## 2022-02-15 NOTE — NURSING NOTE
Vital Signs Entered On:  7/13/2021 11:23 AM CDT    Performed On:  7/13/2021 11:23 AM CDT by Chastity Kuhn CMA               Vital Signs   Systolic Blood Pressure :   131 mmHg (HI)    Diastolic Blood Pressure :   88 mmHg (HI)    Mean Arterial Pressure :   102 mmHg   Chastity Kuhn CMA - 7/13/2021 11:23 AM CDT

## 2022-02-15 NOTE — NURSING NOTE
Quick Intake Entered On:  8/12/2020 4:45 PM CDT    Performed On:  8/12/2020 4:44 PM CDT by Chastity Kuhn CMA               Summary   Menstrual Status :   Hysterectomy   Height Measured :   64 in(Converted to: 5 ft 4 in, 162.56 cm)    Systolic Blood Pressure :   168 mmHg (HI)    Diastolic Blood Pressure :   106 mmHg (HI)    Mean Arterial Pressure :   127 mmHg   Peripheral Pulse Rate :   91 bpm   BP Site :   Left arm   BP Method :   Electronic   Chastity Kuhn CMA - 8/12/2020 4:44 PM CDT

## 2022-02-15 NOTE — LETTER
(Inserted Image. Unable to display)   December 11, 2020      EDITH GODINEZ   [place], [place] [id number]        Dear EDITH,     Thank you for selecting [facility] ([facility] for your healthcare needs. Below you will find the results of your recent test(s) done at our clinic.      Anemia labs look improved.  Your thyroid gland is underactive (hypothyroidism) and I would recommend we start you on thyroid hormone replacement.        Result Name Current Result Previous Result Reference Range   Sodium Level (mmol/L)  138 12/9/2020  136 3/10/2020 135 - 146   Potassium Level (mmol/L)  4.2 12/9/2020  4.0 3/10/2020 3.5 - 5.3   Chloride Level (mmol/L)  102 12/9/2020  102 3/10/2020 98 - 110   CO2 Level (mmol/L)  28 12/9/2020  26 3/10/2020 20 - 32   Glucose Level (mg/dL)  85 12/9/2020  89 3/10/2020 65 - 99   BUN (mg/dL)  10 12/9/2020  10 3/10/2020 7 - 25   Creatinine Level (mg/dL)  0.83 12/9/2020  0.82 3/10/2020 0.50 - 1.05   BUN/Creat Ratio  NOT APPLICABLE 12/9/2020  NOT APPLICABLE 3/10/2020 6 - 22   eGFR (mL/min/1.73m2)  81 12/9/2020  83 3/10/2020 > OR = 60 -    eGFR  (mL/min/1.73m2)  94 12/9/2020  96 3/10/2020 > OR = 60 -    Calcium Level (mg/dL)  9.4 12/9/2020  9.5 3/10/2020 8.6 - 10.4   Cholesterol (mg/dL) ((H)) 242 12/9/2020 ((H)) 222 3/6/2019  - <200   Non-HDL Cholesterol ((H)) 184 12/9/2020 ((H)) 178 3/6/2019  - <130   HDL (mg/dL)  58 12/9/2020 ((L)) 44 3/6/2019 > OR = 50 -    Cholesterol/HDL Ratio  4.2 12/9/2020 ((H)) 5.0 3/6/2019  - <5.0   LDL ((H)) 148 12/9/2020 ((H)) 139 3/6/2019    Triglyceride (mg/dL) ((H)) 217 12/9/2020 ((H)) 239 3/6/2019  - <150   TSH (mIU/L) ((H)) 16.18 12/9/2020     Iron Level (mcg/dL)  83 12/9/2020 ((L)) 42 3/10/2020 45 - 160   TIBC  379 12/9/2020  398 3/10/2020 250 - 450   Iron Saturation  22 12/9/2020 ((L)) 11 3/10/2020 16 - 45   Ferritin (ng/mL)  22 12/9/2020 ((L)) 10 3/10/2020 16 - 232   WBC  4.8 12/9/2020  6.3  3/10/2020 3.8 - 10.8   RBC  4.43 12/9/2020  4.14 3/10/2020 3.80 - 5.10   Hgb (gm/dL)  12.7 12/9/2020 ((L)) 11.8 5/22/2020 11.7 - 15.5   Hct (%)  39.2 12/9/2020 ((L)) 34.5 3/10/2020 35.0 - 45.0   MCV (fL)  88.5 12/9/2020  87 5/22/2020 80.0 - 100.0   MCH (pg)  28.7 12/9/2020  27.8 3/10/2020 27.0 - 33.0   MCHC (gm/dL)  32.4 12/9/2020  33.3 3/10/2020 32.0 - 36.0   RDW (%)  15.0 12/9/2020  14.8 3/10/2020 11.0 - 15.0   Platelet  283 12/9/2020  334 3/10/2020 140 - 400   MPV (fL)  11.3 12/9/2020  11.7 3/10/2020 7.5 - 12.5       Please contact me or my assistant at [phone] if you have any questions or concerns.     Sincerely,        Gonzalo De La Rosa MD    What do your labs mean?  Below is a glossary of commonly ordered labs:  LDL - Bad Cholesterol  HDL - Good Cholesterol  AST/ALT - Liver Function  Cr/Creatinine - Kidney Function  Microalbumin - Kidney Function  BUN - Kidney Function  PSA - Prostate   TSH - Thyroid Hormone  HgbA1c - Diabetes Test  Hgb (Hemoglobin) - Red Blood Cells

## 2022-02-15 NOTE — TELEPHONE ENCOUNTER
Entered by Inge Casey on November 29, 2019 2:59:20 PM CST  Spoke with patient, she will proceed with Cologuard.  I reviewed process and advised regarding positive results as well as advising her to check with insurance for coverage.  I mailed order to her for signature.       ---------------------  From: Gonzalo De La Rosa MD   To: [employer] [facility] ([id number] - [place]);     Sent: 11/28/2019 7:16:07 PM CST  Subject: General Message     Can you ask if Yola wants to pursue repeat annual FIT testing or prefers Cologuard

## 2022-02-15 NOTE — NURSING NOTE
Depression Screening Entered On:  3/11/2020 4:46 PM CDT    Performed On:  3/10/2020 4:45 PM CDT by Chastity Kuhn CMA               Depression Screening   Little Interest - Pleasure in Activities :   Not at all   Feeling Down, Depressed, Hopeless :   Not at all   Initial Depression Screen Score :   0    Trouble Falling or Staying Asleep :   Not at all   Feeling Tired or Little Energy :   Not at all   Poor Appetite or Overeating :   Not at all   Feeling Bad About Yourself :   Not at all   Trouble Concentrating :   Not at all   Thoughts Better Off Dead or Hurting Self :   Not at all   Chastity Kuhn CMA - 3/11/2020 4:45 PM CDT

## 2022-02-15 NOTE — NURSING NOTE
Comprehensive Intake Entered On:  6/17/2020 3:35 PM CDT    Performed On:  6/17/2020 3:31 PM CDT by Jolynn Gilliam CMA               Summary   Chief Complaint :   s/p LAVH BSO done 6/3. Pt doing well.    Menstrual Status :   Hysterectomy   Weight Measured :   156 lb(Converted to: 156 lb 0 oz, 70.76 kg)    Height Measured :   64 in(Converted to: 5 ft 4 in, 162.56 cm)    Body Mass Index :   26.77 kg/m2 (HI)    Body Surface Area :   1.79 m2   Systolic Blood Pressure :   170 mmHg (HI)    Diastolic Blood Pressure :   106 mmHg (HI)    Mean Arterial Pressure :   127 mmHg   Peripheral Pulse Rate :   76 bpm   BP Site :   Right arm   Pulse Site :   Radial artery   BP Method :   Manual   HR Method :   Manual   Jolynn Gilliam CMA - 6/17/2020 3:31 PM CDT   Health Status   Allergies Verified? :   Yes   Medication History Verified? :   Yes   Pre-Visit Planning Status :   Completed   Tobacco Use? :   Never smoker   Jolynn Gilliam CMA - 6/17/2020 3:31 PM CDT   Meds / Allergies   (As Of: 6/17/2020 3:35:21 PM CDT)   Allergies (Active)   No Known Medication Allergies  Estimated Onset Date:   Unspecified ; Created By:   Chastity Kuhn CMA; Reaction Status:   Active ; Category:   Drug ; Substance:   No Known Medication Allergies ; Type:   Allergy ; Updated By:   Chastity Kuhn CMA; Reviewed Date:   7/2/2018 12:48 PM CDT        Medication List   (As Of: 6/17/2020 3:35:21 PM CDT)   Prescription/Discharge Order    amitriptyline  :   amitriptyline ; Status:   Prescribed ; Ordered As Mnemonic:   amitriptyline 50 mg oral tablet ; Simple Display Line:   50 mg, 1 tab(s), Oral, hs, 90 tab(s), 3 Refill(s) ; Ordering Provider:   Gonzalo De La Rosa MD; Catalog Code:   amitriptyline ; Order Dt/Tm:   11/27/2019 4:40:21 PM CST          ascorbic acid-carbonyl iron  :   ascorbic acid-carbonyl iron ; Status:   Prescribed ; Ordered As Mnemonic:   Vitron-C 125 mg-65 mg oral tablet ; Simple Display Line:   1 tab(s), Oral, daily, 90 tab(s), 3 Refill(s) ; Ordering  Provider:   Gonzalo De La Rosa MD; Catalog Code:   ascorbic acid-carbonyl iron ; Order Dt/Tm:   3/7/2019 10:53:26 AM CST          SUMAtriptan  :   SUMAtriptan ; Status:   Prescribed ; Ordered As Mnemonic:   SUMAtriptan 25 mg oral tablet ; Simple Display Line:   See Instructions, TAKE ONE TO TWO TABLETS BY MOUTH ONCE AT ONSET OF MIGRAINE HEADACHE AS NEEDED AS DIRECTED, 18 unknown unit, 3 Refill(s) ; Ordering Provider:   Gonzalo De La Rosa MD; Catalog Code:   SUMAtriptan ; Order Dt/Tm:   3/24/2020 11:22:44 AM CDT            ID Risk Screen   Recent Travel History :   No recent travel   Family Member Travel History :   No recent travel   Other Exposure to Infectious Disease :   Unknown   Jolynn Gilliam CMA - 6/17/2020 3:31 PM CDT

## 2022-02-15 NOTE — NURSING NOTE
Comprehensive Intake Entered On:  3/10/2020 3:56 PM CDT    Performed On:  3/10/2020 3:55 PM CDT by Chastity Kuhn CMA               Summary   Systolic Blood Pressure :   158 mmHg (HI)    Diastolic Blood Pressure :   100 mmHg (HI)    Mean Arterial Pressure :   119 mmHg   Peripheral Pulse Rate :   88 bpm   Temperature Tympanic :   98 DegF(Converted to: 36.7 DegC)    Chastity Kuhn CMA - 3/10/2020 3:57 PM CDT   Chief Complaint :   preop - scheduled for  Lap assisted Vag hysterectomy/BSO with TAW at [facility] on 3/18   Chastity Kuhn CMA - 3/10/2020 4:07 PM CDT     Menstrual Status :   Menarcheal   Weight Measured :   158 lb(Converted to: 158 lb 0 oz, 71.67 kg)    Height Measured :   64 in(Converted to: 5 ft 4 in, 162.56 cm)    Body Mass Index :   27.12 kg/m2 (HI)    Body Surface Area :   1.8 m2   BP Site :   Right arm   Chastity Kuhn CMA - 3/10/2020 3:55 PM CDT   Health Status   Allergies Verified? :   Yes   Medication History Verified? :   Yes   Medical History Verified? :   Yes   Pre-Visit Planning Status :   Completed   Tobacco Use? :   Never smoker   Chastity Kuhn CMA 3/10/2020 3:55 PM CDT

## 2022-02-15 NOTE — NURSING NOTE
Comprehensive Intake Entered On:  7/13/2021 11:23 AM CDT    Performed On:  7/13/2021 11:21 AM CDT by Chastity Kuhn CMA               Summary   Chief Complaint :   f/u thyroid/B< - review labs    Menstrual Status :   Hysterectomy   Weight Measured :   164.6 lb(Converted to: 164 lb 10 oz, 74.661 kg)    Systolic Blood Pressure :   137 mmHg (HI)    Diastolic Blood Pressure :   94 mmHg (HI)    Mean Arterial Pressure :   108 mmHg   Peripheral Pulse Rate :   85 bpm   Temperature Tympanic :   97.6 DegF(Converted to: 36.4 DegC)  (LOW)    Chastity Kuhn CMA - 7/13/2021 11:21 AM CDT   Health Status   Allergies Verified? :   Yes   Medication History Verified? :   Yes   Medical History Verified? :   Yes   Pre-Visit Planning Status :   Completed   Tobacco Use? :   Never smoker   Chastity Kuhn CMA - 7/13/2021 11:21 AM CDT   Social History   Social History   (As Of: 7/13/2021 11:23:04 AM CDT)   Alcohol:        Current, 1-2 times per week, 1 drinks/episode average.  2 drinks/episode maximum.   (Last Updated: 6/18/2018 8:50:12 AM CDT by Elda Garnica)          Tobacco:        Never (less than 100 in lifetime)   (Last Updated: 6/18/2018 8:50:26 AM CDT by Elda Garnica)   Never (less than 100 in lifetime)   (Last Updated: 12/9/2020 8:19:14 AM CST by Chastity Kuhn CMA)          Electronic Cigarette/Vaping:        Electronic Cigarette Use: Never.   (Last Updated: 12/9/2020 8:19:10 AM CST by Chastity Kuhn CMA)          Substance Abuse:        Never   (Last Updated: 6/18/2018 8:50:29 AM CDT by Elda Garnica)          Employment/School:        Employed, Work/School description: [employer] .   (Last Updated: 7/27/2010 9:23:35 AM CDT by Boubacar Meza MD)          Home/Environment:        Marital status: .   (Last Updated: 6/18/2018 8:50:37 AM CDT by Elda Garnica)          Nutrition/Health:        Type of diet: Regular.   (Last Updated: 6/18/2018 8:50:43 AM CDT by Elda Garnica)          Sexual:        Sexually active: Yes.   Identifies as female, Sexual orientation: Straight or heterosexual.   (Last Updated: 6/18/2018 8:50:56 AM CDT by Elda Garnica)

## 2022-02-15 NOTE — LETTER
(Inserted Image. Unable to display)     March 02, 2020      EDITH GODINEZ   [place], [place] [id number]          Dear EDITH,      Thank you for selecting [facility] (previously [place] [place] [facility]) for your healthcare needs.      Our records indicate you are due for the following services:     Non-Fasting Labs.    If you had your labs done at another facility or with Direct Access Lab Testing at [facility], please bring in a copy of the results to your next visit, mail a copy, or drop off a copy of your results to your Healthcare Provider.      To schedule an appointment or if you have further questions, please contact your primary clinic:   [facility]       [phone]   formerly [facility]       [phone]              [facility]     [phone]      Powered by [employer] and [employer]    Sincerely,    Gonzalo De La Rosa MD

## 2022-02-15 NOTE — LETTER
(Inserted Image. Unable to display)     September 09, 2019      EDITH GODINEZ   [place], [place] [id number]          Dear EDITH,      Thank you for selecting [facility] (previously [place] [place] [facility]) for your healthcare needs.      Our records indicate you are due for the following services:     Non-Fasting Labs.    If you had your labs done at another facility or with Direct Access Lab Testing at [facility], please bring in a copy of the results to your next visit, mail a copy, or drop off a copy of your results to your Healthcare Provider.      To schedule an appointment or if you have further questions, please contact your primary clinic:   [facility]       [phone]   Former[facility]       [phone]              [facility]     [phone]      Powered by [employer] and [employer]    Sincerely,    Gonzalo De La Rosa MD

## 2022-02-15 NOTE — NURSING NOTE
Quick Intake Entered On:  3/10/2020 4:24 PM CDT    Performed On:  3/10/2020 4:24 PM CDT by Chastity Kuhn CMA               Summary   Menstrual Status :   Menarcheal   Height Measured :   64 in(Converted to: 5 ft 4 in, 162.56 cm)    Systolic Blood Pressure :   169 mmHg (HI)    Diastolic Blood Pressure :   104 mmHg (HI)    Mean Arterial Pressure :   126 mmHg   Peripheral Pulse Rate :   87 bpm   Chastity Kuhn CMA - 3/10/2020 4:24 PM CDT

## 2022-02-15 NOTE — PROGRESS NOTES
Patient:   EDITH GODINEZ            MRN: [id number]            FIN: [id number]               Age:   47 years     Sex:  Female     :  1970   Associated Diagnoses:   None   Author:   Casey Leger MD      Visit Information      Date of Service: 2017 02:42 pm  Performing Location: [facility]  Encounter#: [id number]      Primary Care Provider (PCP):  Rj MONTGOMERY, Gonzalo    NPI# [id number]      Referring Provider:  Gonzalo De La Rosa MD    NPI# [id number]      Chief Complaint   2017 3:19 PM CDT    Pt here to discuss tonsils, patient has recurrent tonsiliths, BRM and TFS sent her as she has some questions,        History of Present Illness   Asked to see by Dr. De La Rosa for evaluation of tonsils.  Patient reports that she has tonsil stones.  She wants to know what she can do to prevent them.  They seem to come and go.  In the past it was infrequent.  Since spring it has been more often. She can't seem to remove them anymore.  She feels them all of the time.  Essentially things have gotten worse.        Review of Systems   Constitutional:  Negative.    Ear/Nose/Mouth/Throat:  Negative except as documented in history of present illness.    Respiratory:  Negative.       Health Status   Allergies:    Allergic Reactions (Selected)  No known allergies   Medications:  (Selected)   Prescriptions  Prescribed  SUMAtriptan 25 mg oral tablet: 1-2 tab(s), PO, Once, PRN: for migraine headache, # 18 tab(s), 6 Refill(s), Type: Soft Stop, [facility], Patient is due appt.  Needs visit for additional refills, 1-2 tab(s) po once,PRN:for migraine headache  Documented Medications  Documented  Excedrin Migraine: 2 tab(s), po, q6 hrs, 0 Refill(s), Type: Maintenance  ibuprofen: 0 Refill(s), Type: Maintenance      Histories   Past Medical History:    Resolved  Hospitalized for pneumonia: Onset in  at 3 years.  Resolved.  Pregnancy (841876640):  Resolved in  at 22 years.  Pregnancy (709633327):   Resolved in 1996 at 25 years.  Pregnancy (401601745):  Resolved in 1998 at 27 years.   Family History:    Leukemia  Mother  Comments:  7/26/2010 12:17 PM - Mirna Harrison  CLL  Miscellaneous  Father  Comments:  8/5/2013 3:34 PM - Boubacar Meza MD  arthritis and noncancerous jaw tumor     Procedure history:    No active procedure history items have been selected or recorded.      Physical Examination   Vital Signs   8/18/2017 3:19 PM CDT    Temperature Tympanic      98.0 DegF     CONSTITUTIONAL  General Appearance:  Normal, well developed, well nourished, no obvious distress  Ability to Communicate:  communicates appropriately.    HEAD AND FACE  Appearance and Symmetry:  Normal, no scalp or facial scarring or suspicious lesions.    EARS  Clinical speech reception threshold:  Normal, able to hear normal speech.  Auricle:  Normal, Auricles without scars, lesions, masses.  External auditory canal:  Normal, External auditory canal normal.  Tympanic membrane:  Normal, Tympanic membranes normal without swelling or erythema.  Tympanic membrane mobility:  Normal, Normal tympanic membrane mobility.    NOSE (speculum or scope)  Architecture:  Normal, Grossly normal external nasal architecture with no masses or lesions.  Mucosa:  Normal mucosa, No polyps or masses.  Septum:  Normal, Septum non-obstructing.  Turbinates:  Normal, No turbinate abnormalities    ORAL CAVITY AND OROPHARYNX  Lips:  Normal.  Dental and gingiva:  Normal, No obvious dental or gingival disease.  Mucosa:  Normal, Moist mucous membranes.  Tongue:  Normal, Tongue mobile with no mucosal abnormalities  Hard and soft palate:  Normal, Hard and soft palate without cleft or mucosal lesions.  Tonsils:  Small cryptic tonsils  Oral pharynx:  Normal, Posterior pharynx without lesions or remarkable asymmetry.  Saliva:  Normal, Clear saliva.  Masses:  Normal, No palpable masses or pathologically enlarged lymph nodes.    NECK  Masses/lymph nodes:  Normal, No worrisome  neck masses or lymph nodes.  Salivary glands:  Normal, Parotid and submandibular glands.  Trachea and larynx position:  Normal, Trachea and larynx midline.  Thyroid:  Normal, No thyroid abnormality.  Tenderness:  Normal, No cervical tenderness.  Suppleness:  Normal, Neck supple    NEUROLOGICAL  Speech pattern:  Normal, Proasaic    RESPIRATORY  Symmetry and Respiratory effort:  Normal, Symmetric chest movement and expansion with no increased intercostal retractions or use of accessory muscles.       Impression and Plan   Tonsil stones.  I discussed options including tonsillectomy.  I discussed procedure, goals and risks.  I answered her questions.  She wants to think about her options.  If she decides to proceed with surgery she was provided with my contact information.

## 2022-02-15 NOTE — PROGRESS NOTES
Patient:   YOLA GODINEZ            MRN: [id number]            FIN: [id number]               Age:   49 years     Sex:  Female     :  1970   Associated Diagnoses:   Migraine aura, persistent   Author:   Gonzalo De La Rosa MD      Visit Information      Date of Service: 2019 01:27 pm  Performing Location: [facility]  Encounter#: [id number]      Primary Care Provider (PCP):  Gonzalo De La Rosa MD    NPI# [id number]      Referring Provider:  Gonzalo De La Rosa MD    NPI# [id number]      Chief Complaint   2019 1:29 PM CST   migraine f/u and refill.              Additional Information:No additional information recorded during visit.   Chief complaint and symptoms as noted above and confirmed with patient.  Recent lab and diagnostic studies reviewed with patient      History of Present Illness   2014: Yola presents to clinic with worsening migraine headaches.  She says that headaches typically have a predictable or a with left-sided cheek tingling and pain followed by headache extending down into neck and back of head.  Headaches typically can last 1-2 days.  Usually are fairly responsive to higher dose ibuprofen; 800-1600 mg a day.  More recently is finding that ibuprofen has not been as effective.  Typically states that headaches occur approximately every 2 weeks.  She strictly avoids ibuprofen when not having migraine headaches.  Historically has not found Excedrin to be as helpful.  She s never tried prescription medications for her migraine.  She s also never been on any maintenance therapies. 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    2019:  Yola returns for follow-up.  She has had good success with amitriptyline introduction now taking 50 mg at night.  Her migraine frequency is essentially now only limited to development of menses.  Has been taking Imitrex preemptively just the beginning of her periods.  Had not been very dedicated to oral iron therapy though has try to increase regular consumption over the  last few months.  Has only been taking once a day.  Still has heavy menses typically having bleeding I will persist for 4 to 5 days with some clotting.  She has been doing quite a bit of research on her own about whether she wants to pursue hysterectomy.  She has appropriate questions here today.         Review of Systems   Constitutional:  No fever, No chills.    Eye:  Negative except as documented in history of present illness.    Ear/Nose/Mouth/Throat:  Negative except as documented in history of present illness.    Respiratory:  No shortness of breath.    Cardiovascular:  No chest pain, No palpitations, No peripheral edema, No syncope.    Gastrointestinal:  No nausea, No vomiting, No abdominal pain.    Genitourinary:  No dysuria, No hematuria.    Gynecologic:  menorrhagia.    Hematology/Lymphatics:  Negative except as documented in history of present illness.    Endocrine:  No excessive thirst, No polyuria.    Immunologic:  No recurrent fevers.    Musculoskeletal:  No joint pain, No muscle pain.    Neurologic:  Alert and oriented X4, Headache, No numbness, No tingling.       Health Status   Allergies:    Allergic Reactions (Selected)  No Known Medication Allergies   Medications:  (Selected)   Prescriptions  Prescribed  SUMAtriptan 25 mg oral tablet: See Instructions, Instructions: TAKE ONE TO TWO TABLETS BY MOUTH ONCE AT ONSET OF MIGRAINE HEADACHE AS NEEDED AS DIRECTED, # 18 tab(s), 11 Refill(s), Type: Soft Stop, [facility], TAKE ONE TO TWO TABLETS BY MOUTH ONCE...  Vitron-C 125 mg-65 mg oral tablet: 1 tab(s), Oral, daily, # 90 tab(s), 3 Refill(s), Type: Maintenance, [facility], 1 tab(s) Oral daily  amitriptyline 50 mg oral tablet: = 1 tab(s) ( 50 mg ), Oral, hs, # 90 tab(s), 3 Refill(s), Type: Maintenance, [facility], note dose change, 1 tab(s) Oral hs,    Medications          *denotes recorded medication           SUMAtriptan 25 mg oral tablet: See Instructions, TAKE ONE TO TWO TABLETS BY MOUTH ONCE AT ONSET OF MIGRAINE HEADACHE AS NEEDED AS DIRECTED, 18 tab(s), 11 Refill(s).          amitriptyline 50 mg oral tablet: 50 mg, 1 tab(s), Oral, hs, 90 tab(s), 3 Refill(s).          Vitron-C 125 mg-65 mg oral tablet: 1 tab(s), Oral, daily, 90 tab(s), 3 Refill(s).       Problem list:    All Problems  Heavy menstrual period / SNOMED CT 1792568845 / Confirmed  Menstrual migraine / SNOMED CT 95387038 / Confirmed  Resolved: Hospitalized for pneumonia  Resolved: Pregnancy / SNOMED CT 470481486  Resolved: Pregnancy / SNOMED CT 076196280  Resolved: Pregnancy / SNOMED CT 149258613      Histories   Past Medical History:    Resolved  Hospitalized for pneumonia: Onset in 1973 at 3 years.  Resolved.  Pregnancy (608867491):  Resolved in 1993 at 22 years.  Pregnancy (779818651):  Resolved in 1996 at 25 years.  Pregnancy (695438665):  Resolved in 1998 at 27 years.   Family History:    Leukemia  Mother  Comments:  7/26/2010 12:17 PM CDT - Mirna Harrison  CLL  Miscellaneous  Father  Comments:  8/5/2013 3:34 PM CDT - Boubacar Meza MD  arthritis and noncancerous jaw tumor  Diabetes  Son (Luis Fernando)     Procedure history:    No active procedure history items have been selected or recorded.   Social History:        Alcohol Assessment            Current, 1-2 times per week, 1 drinks/episode average.  2 drinks/episode maximum.      Tobacco Assessment            Never (less than 100 in lifetime)      Substance Abuse Assessment            Never      Employment and Education Assessment            Employed, Work/School description: [employer] .      Home and Environment Assessment            Marital status: .      Nutrition and Health Assessment            Type of diet: Regular.      Sexual Assessment            Sexually active: Yes.  Identifies as female, Sexual orientation: Straight or heterosexual.        Physical Examination   vital  signs stable, as noted above   Vital Signs   11/27/2019 1:29 PM CST Peripheral Pulse Rate 72 bpm    Pulse Site Radial artery    HR Method Manual    Systolic Blood Pressure 142 mmHg  HI    Diastolic Blood Pressure 98 mmHg  HI    Mean Arterial Pressure 113 mmHg    BP Site Right arm    BP Method Manual      Measurements from flowsheet : Measurements   11/27/2019 1:29 PM CST   Weight Measured - Standard                156 lb     General:  Alert and oriented, No acute distress.    Eye:  Pupils are equal, round and reactive to light.    Respiratory:  Lungs are clear to auscultation, Respirations are non-labored.    Cardiovascular:  Normal rate.    Neurologic:  Alert, Oriented, Normal motor function, No focal deficits.    Cognition and Speech:  Oriented, Speech clear and coherent.    Psychiatric:  Appropriate mood & affect.       Review / Management   Results review:  Lab results   10/21/2019 4:06 PM CDT Iron Level 45 mcg/dL    TIBC 382    Iron Saturation 12  LOW    Ferritin 10 ng/mL  LOW    WBC 5.7    RBC 3.98    Hgb 10.6 gm/dL  LOW    Hct 32.7 %  LOW    MCV 82.2 fL    MCH 26.6 pg  LOW    MCHC 32.4 gm/dL    RDW 15.2 %  HI    Platelet 331    MPV 11.7 fL   .       Impression and Plan   Diagnosis     Migraine aura, persistent ([id number]-CE G43.509).         .) migraine HAs  - having success with sumatriptan 25-50mg as needed; typically occurring at time of menstuation; potential perimenopausal association  - good response since intro of amitriptyline 50mg qhs    .) iron deficiency anemia; I suspect mainly related to menorrhagia   - hgb 10.6; MCV 82   - on vitamin C + elemental iron - advised to increase to BID   - consider future hysterectomy if continued menorrhagia    .) health maintenance   - she did see TAW: normal PAP w/ HPV negative (6/2018)   - continue annual mammo   - normal FBS   - mildly elevated LDL, triglycerides   - normal FIT testing (6/2018) - will look to pursue further non-invasive testing    RTC annually;  repeat iron studies in 3 months

## 2022-02-15 NOTE — LETTER
(Inserted Image. Unable to display)   March 07, 2019      EDITH GODINEZ   [place], [place] [id number]        Dear EDITH,     Thank you for selecting [facility] (previously [facility]) for your healthcare needs. Below you will find the results of your recent test(s) done at our clinic.     Labs for your review.  Remarkable for anemia; I suspect iron deficiency.  My clinic staff will contact you on further instructions.        Result Name Current Result Previous Result Reference Range   Glucose Level (mg/dL)  87 3/6/2019  84 8/11/2017 65 - 99   Cholesterol (mg/dL) ((H)) 222 3/6/2019 ((H)) 222 8/11/2017  - <200   Non-HDL Cholesterol ((H)) 178 3/6/2019 ((H)) 175 8/11/2017  - <130   HDL (mg/dL) ((L)) 44 3/6/2019  47 8/11/2017 >50 -    Cholesterol/HDL Ratio ((H)) 5.0 3/6/2019  4.7 8/11/2017  - <5.0   LDL ((H)) 139 3/6/2019 ((H)) 146 8/11/2017    Triglyceride (mg/dL) ((H)) 239 3/6/2019  144 8/11/2017  - <150   WBC  5.3 3/6/2019  3.8 - 10.8   RBC  4.16 3/6/2019  3.80 - 5.10   Hgb (gm/dL) ((L)) 10.2 3/6/2019  11.7 - 15.5   Hct (%) ((L)) 32.6 3/6/2019  35.0 - 45.0   MCV (fL) ((L)) 78.4 3/6/2019  80.0 - 100.0   MCH (pg) ((L)) 24.5 3/6/2019  27.0 - 33.0   MCHC (gm/dL) ((L)) 31.3 3/6/2019  32.0 - 36.0   RDW (%) ((H)) 15.4 3/6/2019  11.0 - 15.0   Platelet  362 3/6/2019  140 - 400   MPV (fL)  11.4 3/6/2019  7.5 - 12.5       Please contact me or my assistant at [phone] if you have any questions or concerns.     Sincerely,        Gonzalo De La Rosa MD    What do your labs mean?  Below is a glossary of commonly ordered labs:  LDL - Bad Cholesterol  HDL - Good Cholesterol  AST/ALT - Liver Function  Cr/Creatinine - Kidney Function  Microalbumin - Kidney Function  BUN - Kidney Function  PSA - Prostate   TSH - Thyroid Hormone  HgbA1c - Diabetes Test  Hgb (Hemoglobin) - Red Blood Cells

## 2022-02-15 NOTE — NURSING NOTE
CAGE Assessment Entered On:  7/13/2021 2:57 PM CDT    Performed On:  7/13/2021 2:57 PM CDT by Chastity Kuhn CMA               Assessment   Have you ever felt you should cut down on your drinking :   No   Have people annoyed you by criticizing your drinking :   No   Have you ever felt bad or guilty about your drinking :   No   Have you ever taken a drink first thing in the morning to steady your nerves or get rid of a hangover (Eye-opener) :   No   CAGE Score :   0    Chastity Kuhn CMA - 7/13/2021 2:57 PM CDT

## 2022-02-15 NOTE — NURSING NOTE
Comprehensive Intake Entered On:  12/9/2020 8:22 AM CST    Performed On:  12/9/2020 8:18 AM CST by Chastity Kuhn CMA               Summary   Chief Complaint :   f/u BP   Menstrual Status :   Hysterectomy   Weight Measured :   154.12 lb(Converted to: 154 lb 2 oz, 69.908 kg)    Height Measured :   64 in(Converted to: 5 ft 4 in, 162.56 cm)    Body Mass Index :   26.45 kg/m2 (HI)    Body Surface Area :   1.77 m2   Systolic Blood Pressure :   146 mmHg (HI)    Diastolic Blood Pressure :   96 mmHg (HI)    Mean Arterial Pressure :   113 mmHg   Peripheral Pulse Rate :   77 bpm   BP Site :   Right arm   Pulse Site :   Brachial artery   BP Method :   Electronic   Temperature Tympanic :   97.7 DegF(Converted to: 36.5 DegC)  (LOW)    Oxygen Saturation :   96 %   Chastity Kuhn CMA - 12/9/2020 8:18 AM CST   Health Status   Allergies Verified? :   Yes   Medication History Verified? :   Yes   Medical History Verified? :   Yes   Pre-Visit Planning Status :   Completed   Tobacco Use? :   Never smoker   Chastity Kuhn CMA - 12/9/2020 8:18 AM CST   ID Risk Screen   Recent Travel History :   No recent travel   Family Member Travel History :   No recent travel   Other Exposure to Infectious Disease :   Unknown   Chastity Kuhn CMA - 12/9/2020 8:18 AM CST   Social History   Social History   (As Of: 12/9/2020 8:22:14 AM CST)   Alcohol:        Current, 1-2 times per week, 1 drinks/episode average.  2 drinks/episode maximum.   (Last Updated: 6/18/2018 8:50:12 AM CDT by Elda Garnica)          Tobacco:        Never (less than 100 in lifetime)   (Last Updated: 6/18/2018 8:50:26 AM CDT by Elda Garnica)   Never (less than 100 in lifetime)   (Last Updated: 12/9/2020 8:19:14 AM CST by Chastity Kuhn CMA)          Electronic Cigarette/Vaping:        Electronic Cigarette Use: Never.   (Last Updated: 12/9/2020 8:19:10 AM CST by Chastity Kuhn CMA)          Substance Abuse:        Never   (Last Updated: 6/18/2018 8:50:29 AM CDT by Elda Garnica)           Employment/School:        Employed, Work/School description: [employer] .   (Last Updated: 7/27/2010 9:23:35 AM CDT by Boubacar Meza MD)          Home/Environment:        Marital status: .   (Last Updated: 6/18/2018 8:50:37 AM CDT by Elda Garnica)          Nutrition/Health:        Type of diet: Regular.   (Last Updated: 6/18/2018 8:50:43 AM CDT by Elda Garnica)          Sexual:        Sexually active: Yes.  Identifies as female, Sexual orientation: Straight or heterosexual.   (Last Updated: 6/18/2018 8:50:56 AM CDT by Elda Garnica)

## 2022-03-02 DIAGNOSIS — N95.1 MENOPAUSAL SYNDROME (HOT FLASHES): Primary | ICD-10-CM

## 2022-03-02 NOTE — TELEPHONE ENCOUNTER
Last Written Prescription Date:  7/13/21  Last Fill Quantity: 45,  # refills: 1   Last office visit:7/13/21  with prescribing provider:  DASHA   Future Office Visit:          Routing refill request to provider for review/approval because:  Med started at visit in July 2021    Please advise on refill    Delores Porras RN

## 2022-03-04 RX ORDER — ESTRADIOL 1 MG/1
TABLET ORAL
Qty: 45 TABLET | Refills: 1 | Status: SHIPPED | OUTPATIENT
Start: 2022-03-04 | End: 2022-08-31

## 2022-07-23 DIAGNOSIS — N95.1 MENOPAUSAL SYNDROME (HOT FLASHES): ICD-10-CM

## 2022-07-23 DIAGNOSIS — I10 ESSENTIAL HYPERTENSION: Primary | ICD-10-CM

## 2022-07-26 RX ORDER — LOSARTAN POTASSIUM 100 MG/1
TABLET ORAL
Qty: 90 TABLET | Refills: 1 | Status: SHIPPED | OUTPATIENT
Start: 2022-07-26 | End: 2022-10-06

## 2022-07-26 NOTE — TELEPHONE ENCOUNTER
Routing refill request to provider for review/approval because:  Patient needs to be seen because it has been more than 1 year since last office visit.    Last seen for physical office visit 8/12/2020 , labs out of date    TC please reach out and help schedule patient     JOVANY Ontiveros  [facility]

## 2022-08-09 DIAGNOSIS — N95.1 MENOPAUSAL SYNDROME (HOT FLASHES): Primary | ICD-10-CM

## 2022-08-10 RX ORDER — SUMATRIPTAN 25 MG/1
TABLET, FILM COATED ORAL
Qty: 18 TABLET | Refills: 3 | Status: SHIPPED | OUTPATIENT
Start: 2022-08-10 | End: 2022-10-06

## 2022-08-10 NOTE — TELEPHONE ENCOUNTER
Routing refill request to provider for review/approval because:    Serotonin Agonists Failed     Serotonin Agonist request needs review.    Recent (12 mo) or future (30 days) visit within the authorizing provider's specialty    Medication is active on med list     JOVANY Ontiveros  [facility]

## 2022-08-29 DIAGNOSIS — N95.1 MENOPAUSAL SYNDROME (HOT FLASHES): ICD-10-CM

## 2022-08-30 NOTE — TELEPHONE ENCOUNTER
Routing refill request to provider for review/approval because:  Drug fails the [facility] refill protocol. Last mammogram found 8/4/2017        Last Written Prescription Date:  3/4/22  Last Fill Quantity: 45,  # refills: 1   Last office visit:  11/24/21

## 2022-08-31 RX ORDER — ESTRADIOL 1 MG/1
TABLET ORAL
Qty: 45 TABLET | Refills: 1 | Status: SHIPPED | OUTPATIENT
Start: 2022-08-31 | End: 2022-10-06

## 2022-08-31 NOTE — TELEPHONE ENCOUNTER
I called and spoke with patient, scheduled her physical for 10/06. Patient is needing refill on one of her medications. Refills sent to RN pool.

## 2022-08-31 NOTE — TELEPHONE ENCOUNTER
Refill Request    Medication name:   Pending Prescriptions:                       Disp   Refills    estradiol (ESTRACE) 1 MG tablet           45 tab*1            Sig: TAKE ONE TABLET BY MOUTH EVERY OTHER DAY    Signed Prescriptions:                        Disp   Refills    losartan (COZAAR) 100 MG tablet            90 tab*1        Sig: TAKE ONE TABLET BY MOUTH EVERY DAY ALONG WITH A 25 MG           HYDROCHLOROTHIAZIDE TABLET. REPLACES           LOSARTAN/HCTZ 100 MG/ 25 MG  Authorizing Provider: ROBBI ADAMS    Who prescribed the medication: Robbi Adams  Requested Pharmacy: [facility]  Last Fill: quantity: 45, dispensed: 03/04/2022, # refills: 1   Last office visit: Visit date not found with prescribing provider:  11/24/2021   Next appointment: Appointment scheduled for 10/06/2022 - physical

## 2022-09-01 RX ORDER — ESTRADIOL 1 MG/1
TABLET ORAL
Qty: 45 TABLET | Refills: 1 | Status: SHIPPED | OUTPATIENT
Start: 2022-09-01 | End: 2022-10-04

## 2022-09-12 DIAGNOSIS — N95.1 MENOPAUSAL SYNDROME (HOT FLASHES): Primary | ICD-10-CM

## 2022-09-13 NOTE — TELEPHONE ENCOUNTER
Routing refill request to provider for review/approval because:  Need diagnosis linked to refill.            Last Written Prescription Date:  Care Everywhere. 3/15/22  #90    Future Office Visit:   Next 5 appointments (look out 90 days)    Oct 06, 2022  4:30 PM  (Arrive by 4:10 PM)  Adult Preventative Visit with Valerio De La Rosa MD  [facility] [address] [facility] [address] [phone]             Pending Prescriptions:                       Disp   Refills    amitriptyline (ELAVIL) 50 MG tablet [Phar*90 tab*3            Sig: TAKE ONE TABLET BY MOUTH AT BEDTIME

## 2022-09-15 RX ORDER — AMITRIPTYLINE HYDROCHLORIDE 50 MG/1
TABLET ORAL
Qty: 90 TABLET | Refills: 0 | Status: SHIPPED | OUTPATIENT
Start: 2022-09-15 | End: 2022-10-06

## 2022-09-16 DIAGNOSIS — E03.9 HYPOTHYROIDISM: Primary | ICD-10-CM

## 2022-09-16 RX ORDER — LEVOTHYROXINE SODIUM 100 UG/1
TABLET ORAL
Qty: 90 TABLET | Refills: 0 | Status: SHIPPED | OUTPATIENT
Start: 2022-09-16 | End: 2022-10-06

## 2022-09-16 NOTE — TELEPHONE ENCOUNTER
Future Office Visit:   Next 5 appointments (look out 90 days)    Oct 06, 2022  4:30 PM  (Arrive by 4:10 PM)  Adult Preventative Visit with Valerio De La Rosa MD  [facility] ([facility] ) [address] [facility] [address]  [phone]               Lab Results   Component Value Date    TSH 3.28 11/24/2021

## 2022-10-04 DIAGNOSIS — E06.3 HYPOTHYROIDISM DUE TO HASHIMOTO'S THYROIDITIS: ICD-10-CM

## 2022-10-04 DIAGNOSIS — I10 ESSENTIAL HYPERTENSION: ICD-10-CM

## 2022-10-04 DIAGNOSIS — G43.109 MIGRAINE WITH AURA AND WITHOUT STATUS MIGRAINOSUS, NOT INTRACTABLE: ICD-10-CM

## 2022-10-04 DIAGNOSIS — Z00.00 HEALTH CARE MAINTENANCE: ICD-10-CM

## 2022-10-04 RX ORDER — HYDROCHLOROTHIAZIDE 25 MG/1
1 TABLET ORAL DAILY
COMMUNITY
Start: 2022-01-23 | End: 2022-10-06

## 2022-10-06 ENCOUNTER — OFFICE VISIT (OUTPATIENT)
Dept: FAMILY MEDICINE | Facility: CLINIC | Age: 52
End: 2022-10-06
Payer: COMMERCIAL

## 2022-10-06 VITALS
OXYGEN SATURATION: 96 % | TEMPERATURE: 97.5 F | DIASTOLIC BLOOD PRESSURE: 68 MMHG | WEIGHT: 166.9 LBS | HEIGHT: 64 IN | HEART RATE: 88 BPM | BODY MASS INDEX: 28.49 KG/M2 | SYSTOLIC BLOOD PRESSURE: 120 MMHG

## 2022-10-06 DIAGNOSIS — Z00.00 HEALTH CARE MAINTENANCE: ICD-10-CM

## 2022-10-06 DIAGNOSIS — G43.109 MIGRAINE WITH AURA AND WITHOUT STATUS MIGRAINOSUS, NOT INTRACTABLE: ICD-10-CM

## 2022-10-06 DIAGNOSIS — Z11.4 SCREENING FOR HIV (HUMAN IMMUNODEFICIENCY VIRUS): ICD-10-CM

## 2022-10-06 DIAGNOSIS — N95.1 MENOPAUSAL SYNDROME (HOT FLASHES): ICD-10-CM

## 2022-10-06 DIAGNOSIS — E06.3 HYPOTHYROIDISM DUE TO HASHIMOTO'S THYROIDITIS: Primary | ICD-10-CM

## 2022-10-06 DIAGNOSIS — Z11.59 NEED FOR HEPATITIS C SCREENING TEST: ICD-10-CM

## 2022-10-06 DIAGNOSIS — I10 ESSENTIAL HYPERTENSION: ICD-10-CM

## 2022-10-06 DIAGNOSIS — Z12.31 VISIT FOR SCREENING MAMMOGRAM: ICD-10-CM

## 2022-10-06 PROCEDURE — 36415 COLL VENOUS BLD VENIPUNCTURE: CPT | Performed by: INTERNAL MEDICINE

## 2022-10-06 PROCEDURE — 99214 OFFICE O/P EST MOD 30 MIN: CPT | Mod: 25 | Performed by: INTERNAL MEDICINE

## 2022-10-06 PROCEDURE — 90682 RIV4 VACC RECOMBINANT DNA IM: CPT | Performed by: INTERNAL MEDICINE

## 2022-10-06 PROCEDURE — 84443 ASSAY THYROID STIM HORMONE: CPT | Performed by: INTERNAL MEDICINE

## 2022-10-06 PROCEDURE — 90471 IMMUNIZATION ADMIN: CPT | Performed by: INTERNAL MEDICINE

## 2022-10-06 PROCEDURE — 80048 BASIC METABOLIC PNL TOTAL CA: CPT | Performed by: INTERNAL MEDICINE

## 2022-10-06 PROCEDURE — 99396 PREV VISIT EST AGE 40-64: CPT | Mod: 25 | Performed by: INTERNAL MEDICINE

## 2022-10-06 RX ORDER — LOSARTAN POTASSIUM AND HYDROCHLOROTHIAZIDE 25; 100 MG/1; MG/1
1 TABLET ORAL DAILY
Qty: 90 TABLET | Refills: 3 | Status: SHIPPED | OUTPATIENT
Start: 2022-10-06 | End: 2023-11-01

## 2022-10-06 RX ORDER — LEVOTHYROXINE SODIUM 100 UG/1
100 TABLET ORAL DAILY
Qty: 90 TABLET | Refills: 3 | Status: SHIPPED | OUTPATIENT
Start: 2022-10-06 | End: 2023-11-22

## 2022-10-06 RX ORDER — SUMATRIPTAN 25 MG/1
TABLET, FILM COATED ORAL
Qty: 18 TABLET | Refills: 3 | Status: SHIPPED | OUTPATIENT
Start: 2022-10-06 | End: 2024-01-26

## 2022-10-06 RX ORDER — ESTRADIOL 1 MG/1
TABLET ORAL
Qty: 45 TABLET | Refills: 3 | Status: SHIPPED | OUTPATIENT
Start: 2022-10-06 | End: 2023-11-22

## 2022-10-06 ASSESSMENT — ENCOUNTER SYMPTOMS
HEADACHES: 0
CONSTIPATION: 0
JOINT SWELLING: 0
COUGH: 0
NERVOUS/ANXIOUS: 0
NAUSEA: 0
FEVER: 0
PALPITATIONS: 0
HEMATOCHEZIA: 0
HEARTBURN: 0
DIARRHEA: 0
PARESTHESIAS: 0
MYALGIAS: 0
EYE PAIN: 0
FREQUENCY: 0
SHORTNESS OF BREATH: 0
WEAKNESS: 0
BREAST MASS: 0
ABDOMINAL PAIN: 0
ARTHRALGIAS: 0
SORE THROAT: 0
DYSURIA: 0
CHILLS: 0
HEMATURIA: 0
DIZZINESS: 0

## 2022-10-06 NOTE — PROGRESS NOTES
SUBJECTIVE:   CC: Yola is an 52 year old who presents for preventive health visit.  Doing well.  Good start to calendar year for school.  Shares stopping her amitriptyline in recent weeks.  Bothered by degree of sedation she had in the morning.  Currently too clear to tell of changing headache frequency.  Did have COVID in August.  Describes fairly mild course as had residual and persistent sinus complaints.      Patient has been advised of split billing requirements and indicates understanding: Yes  Healthy Habits:     Getting at least 3 servings of Calcium per day:  Yes    Bi-annual eye exam:  Yes    Dental care twice a year:  Yes    Sleep apnea or symptoms of sleep apnea:  None    Diet:  Regular (no restrictions)    Frequency of exercise:  None    Taking medications regularly:  Yes    Medication side effects:  None    PHQ-2 Total Score: 0    Additional concerns today:  No          Today's PHQ-2 Score:   PHQ-2 ( 1999 Pfizer) 10/6/2022   Q1: Little interest or pleasure in doing things 0   Q2: Feeling down, depressed or hopeless 0   PHQ-2 Score 0   Q1: Little interest or pleasure in doing things Not at all   Q2: Feeling down, depressed or hopeless Not at all   PHQ-2 Score 0         Have you ever done Advance Care Planning? (For example, a Health Directive, POLST, or a discussion with a medical provider or your loved ones about your wishes):    Social History     Tobacco Use     Smoking status: Never Smoker     Smokeless tobacco: Never Used   Substance Use Topics     Alcohol use: Not on file         Alcohol Use 10/6/2022   Prescreen: >3 drinks/day or >7 drinks/week? No         Breast Cancer Screening:  Any new diagnosis of family breast, ovarian, or bowel cancer? No    FHS-7: No flowsheet data found.    Pertinent mammograms are reviewed under the imaging tab.    History of abnormal Pap smear: prior hysterectomy     Reviewed and updated as needed this visit by clinical staff   Tobacco  Allergies  Meds             "    Reviewed and updated as needed this visit by Provider                     Review of Systems   Constitutional: Negative for chills and fever.   HENT: Negative for congestion, ear pain, hearing loss and sore throat.    Eyes: Negative for pain and visual disturbance.   Respiratory: Negative for cough and shortness of breath.    Cardiovascular: Negative for chest pain, palpitations and peripheral edema.   Gastrointestinal: Negative for abdominal pain, constipation, diarrhea, heartburn, hematochezia and nausea.   Breasts:  Negative for tenderness, breast mass and discharge.   Genitourinary: Negative for dysuria, frequency, genital sores, hematuria, pelvic pain, urgency, vaginal bleeding and vaginal discharge.   Musculoskeletal: Negative for arthralgias, joint swelling and myalgias.   Skin: Negative for rash.   Neurological: Negative for dizziness, weakness, headaches and paresthesias.   Psychiatric/Behavioral: Negative for mood changes. The patient is not nervous/anxious.         OBJECTIVE:   /68 (BP Location: Right arm)   Pulse 88   Temp 97.5  F (36.4  C) (Tympanic)   Ht 1.626 m (5' 4\")   Wt 75.7 kg (166 lb 14.4 oz)   SpO2 96%   BMI 28.65 kg/m    Physical Exam  GENERAL: healthy, alert and no distress  NECK: no adenopathy, no asymmetry, masses, or scars and thyroid normal to palpation  RESP: lungs clear to auscultation - no rales, rhonchi or wheezes  CV: regular rate and rhythm, normal S1 S2, no S3 or S4, no murmur, click or rub, no peripheral edema and peripheral pulses strong  ABDOMEN: soft, nontender, no hepatosplenomegaly, no masses and bowel sounds normal  MS: no gross musculoskeletal defects noted, no edema    Diagnostic Test Results:  Labs reviewed in Epic    ASSESSMENT/PLAN:     Problem List Items Addressed This Visit        Nervous and Auditory    Migraine with aura and without status migrainosus, not intractable     - noticeably improved since SALINA  - having success with sumatriptan 25-50mg as " "needed  - good response on amitriptyline 50mg at bedtime    - since stopped amitriptyline due to daytime sedation complaints.  Advised to monitor headache frequency for now           Relevant Medications    SUMAtriptan (IMITREX) 25 MG tablet       Endocrine    Hypothyroidism due to Hashimoto's thyroiditis - Primary     - on levothyroxine 100mcg           Relevant Medications    levothyroxine (SYNTHROID/LEVOTHROID) 100 MCG tablet    Other Relevant Orders    TSH       Circulatory    Essential hypertension     controlled   current antihypertensive regimen: losartan/hctz 100/25mg daily  regimen changes: none  intolerance:  future titration/work-up plan:              Relevant Medications    losartan-hydrochlorothiazide (HYZAAR) 100-25 MG tablet    Other Relevant Orders    Basic metabolic panel       Other    Health care maintenance      - TAHBSO   - continue annual mammo   - elevated FBS; 107   - dyslipidemia - moderately elevated LDL, triglycerides   - Cologuard negative ; repeat in    - completed COVID vaccination.  Discussed bivalent booster  - influenza today             Other Visit Diagnoses     Screening for HIV (human immunodeficiency virus)        Need for hepatitis C screening test        Visit for screening mammogram        Menopausal syndrome (hot flashes)        Relevant Medications    estradiol (ESTRACE) 1 MG tablet    SUMAtriptan (IMITREX) 25 MG tablet              COUNSELING:  Reviewed preventive health counseling, as reflected in patient instructions       Regular exercise       Colorectal Cancer Screening       Consider Hep C screening for all patients one time for ages 18-79 years       HIV screeninx in teen years, 1x in adult years, and at intervals if high risk    Estimated body mass index is 28.65 kg/m  as calculated from the following:    Height as of this encounter: 1.626 m (5' 4\").    Weight as of this encounter: 75.7 kg (166 lb 14.4 oz).      She reports that she has never smoked. She " has never used smokeless tobacco.      Counseling Resources:  ATP IV Guidelines  Pooled Cohorts Equation Calculator  Breast Cancer Risk Calculator  BRCA-Related Cancer Risk Assessment: FHS-7 Tool  FRAX Risk Assessment  ICSI Preventive Guidelines  Dietary Guidelines for Americans, 2010  USDA's MyPlate  ASA Prophylaxis  Lung CA Screening    Valerio De La Rosa MD  [facility]

## 2022-10-06 NOTE — ASSESSMENT & PLAN NOTE
- TAHBSO   - overdue for mammo; encouraging annual testing   - elevated FBS; 107   - dyslipidemia - moderately elevated LDL, triglycerides   - Cologuard negative '20; repeat in '23   - completed COVID vaccination.  Discussed bivalent booster  - influenza today

## 2022-10-06 NOTE — ASSESSMENT & PLAN NOTE
controlled   current antihypertensive regimen: losartan/hctz 100/25mg daily  regimen changes: none  intolerance:  future titration/work-up plan:

## 2022-10-06 NOTE — ASSESSMENT & PLAN NOTE
- noticeably improved since SALINA  - having success with sumatriptan 25-50mg as needed  - good response on amitriptyline 50mg at bedtime    - since stopped amitriptyline due to daytime sedation complaints.  Advised to monitor headache frequency for now

## 2022-10-07 LAB
ANION GAP SERPL CALCULATED.3IONS-SCNC: 12 MMOL/L (ref 7–15)
BUN SERPL-MCNC: 14 MG/DL (ref 6–20)
CALCIUM SERPL-MCNC: 10 MG/DL (ref 8.6–10)
CHLORIDE SERPL-SCNC: 101 MMOL/L (ref 98–107)
CREAT SERPL-MCNC: 0.86 MG/DL (ref 0.51–0.95)
DEPRECATED HCO3 PLAS-SCNC: 25 MMOL/L (ref 22–29)
GFR SERPL CREATININE-BSD FRML MDRD: 80 ML/MIN/1.73M2
GLUCOSE SERPL-MCNC: 86 MG/DL (ref 70–99)
POTASSIUM SERPL-SCNC: 4.8 MMOL/L (ref 3.4–5.3)
SODIUM SERPL-SCNC: 138 MMOL/L (ref 136–145)
TSH SERPL DL<=0.005 MIU/L-ACNC: 9.88 UIU/ML (ref 0.3–4.2)

## 2022-10-13 NOTE — NURSING NOTE
Comprehensive Intake Entered On:  2/12/2020 9:55 AM CST    Performed On:  2/12/2020 9:52 AM CST by Jolynn Gilliam CMA               Summary   Chief Complaint :   Discuss hysterectomy.    Menstrual Status :   Menarcheal   Weight Measured :   159 lb(Converted to: 159 lb 0 oz, 72.12 kg)    Systolic Blood Pressure :   166 mmHg (HI)    Diastolic Blood Pressure :   108 mmHg (HI)    Mean Arterial Pressure :   127 mmHg   Peripheral Pulse Rate :   72 bpm   BP Site :   Right arm   Pulse Site :   Radial artery   BP Method :   Manual   HR Method :   Manual   Jolynn Gilliam CMA - 2/12/2020 9:52 AM CST   Health Status   Allergies Verified? :   Yes   Medication History Verified? :   Yes   Pre-Visit Planning Status :   Completed   Jolynn Gilliam CMA - 2/12/2020 9:52 AM CST   Meds / Allergies   (As Of: 2/12/2020 9:55:28 AM CST)   Allergies (Active)   No Known Medication Allergies  Estimated Onset Date:   Unspecified ; Created By:   Chastity Kuhn CMA; Reaction Status:   Active ; Category:   Drug ; Substance:   No Known Medication Allergies ; Type:   Allergy ; Updated By:   Chastity Kuhn CMA; Reviewed Date:   7/2/2018 12:48 PM CDT        Medication List   (As Of: 2/12/2020 9:55:28 AM CST)   Prescription/Discharge Order    amitriptyline  :   amitriptyline ; Status:   Prescribed ; Ordered As Mnemonic:   amitriptyline 50 mg oral tablet ; Simple Display Line:   50 mg, 1 tab(s), Oral, hs, 90 tab(s), 3 Refill(s) ; Ordering Provider:   Gonzalo De La Rosa MD; Catalog Code:   amitriptyline ; Order Dt/Tm:   11/27/2019 4:40:21 PM CST          ascorbic acid-carbonyl iron  :   ascorbic acid-carbonyl iron ; Status:   Prescribed ; Ordered As Mnemonic:   Vitron-C 125 mg-65 mg oral tablet ; Simple Display Line:   1 tab(s), Oral, daily, 90 tab(s), 3 Refill(s) ; Ordering Provider:   Gonzalo De La Rosa MD; Catalog Code:   ascorbic acid-carbonyl iron ; Order Dt/Tm:   3/7/2019 10:53:26 AM CST          SUMAtriptan  :   SUMAtriptan ; Status:   Prescribed ; Ordered As  Mnemonic:   SUMAtriptan 25 mg oral tablet ; Simple Display Line:   See Instructions, TAKE ONE TO TWO TABLETS BY MOUTH ONCE AT ONSET OF MIGRAINE HEADACHE AS NEEDED AS DIRECTED, 18 tab(s), 11 Refill(s) ; Ordering Provider:   Gonzalo De La Rosa MD; Catalog Code:   SUMAtriptan ; Order Dt/Tm:   3/6/2019 8:53:54 AM CST   10

## 2023-01-06 ENCOUNTER — LAB (OUTPATIENT)
Dept: LAB | Facility: CLINIC | Age: 53
End: 2023-01-06
Payer: COMMERCIAL

## 2023-01-06 ENCOUNTER — E-VISIT (OUTPATIENT)
Dept: FAMILY MEDICINE | Facility: CLINIC | Age: 53
End: 2023-01-06
Payer: COMMERCIAL

## 2023-01-06 DIAGNOSIS — N30.90 CYSTITIS: Primary | ICD-10-CM

## 2023-01-06 DIAGNOSIS — N30.90 CYSTITIS: ICD-10-CM

## 2023-01-06 DIAGNOSIS — E06.3 HYPOTHYROIDISM DUE TO HASHIMOTO'S THYROIDITIS: ICD-10-CM

## 2023-01-06 LAB
BACTERIA #/AREA URNS HPF: ABNORMAL /HPF
RBC #/AREA URNS AUTO: >100 /HPF
SQUAMOUS #/AREA URNS AUTO: ABNORMAL /LPF
TSH SERPL DL<=0.005 MIU/L-ACNC: 3.29 UIU/ML (ref 0.3–4.2)
WBC #/AREA URNS AUTO: >100 /HPF
WBC CLUMPS #/AREA URNS HPF: PRESENT /HPF

## 2023-01-06 PROCEDURE — 87086 URINE CULTURE/COLONY COUNT: CPT

## 2023-01-06 PROCEDURE — 84443 ASSAY THYROID STIM HORMONE: CPT

## 2023-01-06 PROCEDURE — 36415 COLL VENOUS BLD VENIPUNCTURE: CPT

## 2023-01-06 PROCEDURE — 81015 MICROSCOPIC EXAM OF URINE: CPT

## 2023-01-06 PROCEDURE — 87186 SC STD MICRODIL/AGAR DIL: CPT

## 2023-01-06 PROCEDURE — 99421 OL DIG E/M SVC 5-10 MIN: CPT | Performed by: INTERNAL MEDICINE

## 2023-01-06 NOTE — PATIENT INSTRUCTIONS
Dear Yola Harrison,     After reviewing your responses, I would like you to come in for a urine test to make sure we treat you correctly. This urine test is to evaluate you for a possible urinary tract infection, and should be scheduled for today or tomorrow. Schedule a Lab Only appointment here.     Lab appointments are not available at most locations on the weekends. If no Lab Only appointment is available, you should be seen in any of our convenient Walk-in or Urgent Care Centers, which can be found on our website here.     You will receive instructions with your results in Recorded Future once they are available.     If your symptoms worsen, you develop pain in your back or stomach, develop fevers, or are not improving in 5 days, please contact your primary care provider for an appointment or visit a Walk-in or Urgent Care Center to be seen.     Thanks again for choosing us as your health care partner,     Valerio De La Rosa MD

## 2023-01-07 RX ORDER — NITROFURANTOIN 25; 75 MG/1; MG/1
100 CAPSULE ORAL 2 TIMES DAILY
Qty: 10 CAPSULE | Refills: 0 | Status: SHIPPED | OUTPATIENT
Start: 2023-01-07 | End: 2023-01-12

## 2023-01-08 LAB — BACTERIA UR CULT: ABNORMAL

## 2023-02-07 DIAGNOSIS — G43.109 MIGRAINE WITH AURA AND WITHOUT STATUS MIGRAINOSUS, NOT INTRACTABLE: Primary | ICD-10-CM

## 2023-02-07 NOTE — TELEPHONE ENCOUNTER
Routing refill request to provider for review/approval because:  Drug not active on patient's medication list  Med discontinued on 10/06/2022, Per encounter note, pt stopped med due to pt reporting daytime sedation    Skylar Rodríguez RN

## 2023-02-09 RX ORDER — AMITRIPTYLINE HYDROCHLORIDE 50 MG/1
50 TABLET ORAL AT BEDTIME
Qty: 90 TABLET | Refills: 3 | Status: SHIPPED | OUTPATIENT
Start: 2023-02-09 | End: 2024-03-15

## 2023-09-06 ENCOUNTER — PATIENT OUTREACH (OUTPATIENT)
Dept: CARE COORDINATION | Facility: CLINIC | Age: 53
End: 2023-09-06
Payer: COMMERCIAL

## 2023-09-20 ENCOUNTER — PATIENT OUTREACH (OUTPATIENT)
Dept: CARE COORDINATION | Facility: CLINIC | Age: 53
End: 2023-09-20
Payer: COMMERCIAL

## 2023-10-30 DIAGNOSIS — I10 ESSENTIAL HYPERTENSION: ICD-10-CM

## 2023-11-01 RX ORDER — LOSARTAN POTASSIUM AND HYDROCHLOROTHIAZIDE 25; 100 MG/1; MG/1
1 TABLET ORAL DAILY
Qty: 90 TABLET | Refills: 3 | Status: SHIPPED | OUTPATIENT
Start: 2023-11-01

## 2023-11-09 DIAGNOSIS — Z12.11 COLON CANCER SCREENING: ICD-10-CM

## 2023-11-15 ENCOUNTER — OFFICE VISIT (OUTPATIENT)
Dept: FAMILY MEDICINE | Facility: CLINIC | Age: 53
End: 2023-11-15
Payer: COMMERCIAL

## 2023-11-15 ENCOUNTER — ANCILLARY PROCEDURE (OUTPATIENT)
Dept: GENERAL RADIOLOGY | Facility: CLINIC | Age: 53
End: 2023-11-15
Attending: PHYSICIAN ASSISTANT
Payer: COMMERCIAL

## 2023-11-15 VITALS
DIASTOLIC BLOOD PRESSURE: 70 MMHG | BODY MASS INDEX: 28.7 KG/M2 | OXYGEN SATURATION: 96 % | WEIGHT: 168.1 LBS | TEMPERATURE: 97.7 F | SYSTOLIC BLOOD PRESSURE: 122 MMHG | RESPIRATION RATE: 16 BRPM | HEART RATE: 106 BPM | HEIGHT: 64 IN

## 2023-11-15 DIAGNOSIS — M79.644 PAIN OF FINGER OF RIGHT HAND: Primary | ICD-10-CM

## 2023-11-15 DIAGNOSIS — M79.644 PAIN OF FINGER OF RIGHT HAND: ICD-10-CM

## 2023-11-15 DIAGNOSIS — R22.31 LOCALIZED SWELLING, MASS AND LUMP, RIGHT UPPER LIMB: ICD-10-CM

## 2023-11-15 PROCEDURE — 73130 X-RAY EXAM OF HAND: CPT | Mod: TC | Performed by: RADIOLOGY

## 2023-11-15 PROCEDURE — 99213 OFFICE O/P EST LOW 20 MIN: CPT | Performed by: PHYSICIAN ASSISTANT

## 2023-11-15 ASSESSMENT — ENCOUNTER SYMPTOMS: ARTHRALGIAS: 1

## 2023-11-15 NOTE — PROGRESS NOTES
"  Assessment & Plan     Pain of finger of right hand  Treated strain she may use ice ibuprofen it already is improving if it does not continue to improve over the next week or so she can MyChart me and we can get her into Ortho  - XR Hand Right G/E 3 Views  Results for orders placed or performed in visit on 11/15/23   XR Hand Right G/E 3 Views     Status: None    Narrative    EXAM: XR HAND RIGHT G/E 3 VIEWS  LOCATION: [facility]  DATE: 11/15/2023    INDICATION:  Pain of finger of right hand  COMPARISON: None.      Impression    IMPRESSION: Soft tissue swelling dorsal to the MCP joints. No fractures are evident. Mild degenerative changes in the IP joint of the thumb.       Localized swelling, mass and lump, right upper limb  We will obtain ultrasound better define this area  - US Upper Extremity Non Vascular Right               BMI:   Estimated body mass index is 28.85 kg/m  as calculated from the following:    Height as of this encounter: 1.626 m (5' 4\").    Weight as of this encounter: 76.2 kg (168 lb 1.6 oz).           SKYE Granado  [facility]    Marta Aceves is a 53 year old, presenting for the following health issues:  Hand Injury (Right hand knuckles swollen for the past 3 week after moving furniture ) and bump on arm (On right upper arm changing in size )        11/15/2023     8:47 AM   Additional Questions   Roomed by EDISON Peterson       53-year-old (clinic with 2 concerns of her right upper extremity  About 3 weeks ago she was moving a mattress into started she was getting a little frustrated and went to push it vigorously into the closet and her right hand hyperextended she has tender at the third MCP dorsally it is somewhat better now but still symptomatic  No past history of injury to the hand  Second concern is that lateral aspect of the right bicep she has a soft tissue mass she states its been there for a long time but family feels its " "more prominent it is not painful and does not bother her at all there has been no injury    History of Present Illness       Reason for visit:  Injury to hand and lump on arm  Symptom onset:  3-4 weeks ago    She eats 2-3 servings of fruits and vegetables daily.She consumes 0 sweetened beverage(s) daily.She exercises with enough effort to increase her heart rate 9 or less minutes per day.  She exercises with enough effort to increase her heart rate 3 or less days per week.   She is taking medications regularly.                 Review of Systems   Musculoskeletal:  Positive for arthralgias.            Objective    /70 (BP Location: Right arm, Patient Position: Sitting, Cuff Size: Adult Regular)   Pulse 106   Temp 97.7  F (36.5  C) (Tympanic)   Resp 16   Ht 1.626 m (5' 4\")   Wt 76.2 kg (168 lb 1.6 oz)   LMP 03/24/2020 (Approximate)   SpO2 96%   BMI 28.85 kg/m    Body mass index is 28.85 kg/m .  Physical Exam on exam she has intact radial pulse of the right upper extremity tenderness over the dorsum of the third MCP is minimal swelling there does not have tenderness about the rest of the hand or phalanges there is tenderness with resisted extension of the third digit she has good strength of superficialis and profundus tendons normal strength is noted  Right upper extremity the midportion of the bicep laterally she has a area of soft tissue swelling and freely mobile mass a little larger than golf ball size nontender no skin changes noted                        "

## 2023-11-22 DIAGNOSIS — N95.1 MENOPAUSAL SYNDROME (HOT FLASHES): ICD-10-CM

## 2023-11-22 DIAGNOSIS — E06.3 HYPOTHYROIDISM DUE TO HASHIMOTO'S THYROIDITIS: ICD-10-CM

## 2023-11-22 RX ORDER — ESTRADIOL 1 MG/1
TABLET ORAL
Qty: 45 TABLET | Refills: 3 | Status: SHIPPED | OUTPATIENT
Start: 2023-11-22

## 2023-11-22 RX ORDER — LEVOTHYROXINE SODIUM 100 UG/1
100 TABLET ORAL DAILY
Qty: 90 TABLET | Refills: 0 | Status: SHIPPED | OUTPATIENT
Start: 2023-11-22 | End: 2024-03-04

## 2023-11-23 ENCOUNTER — LAB (OUTPATIENT)
Dept: FAMILY MEDICINE | Facility: CLINIC | Age: 53
End: 2023-11-23
Payer: COMMERCIAL

## 2023-11-23 DIAGNOSIS — Z12.11 COLON CANCER SCREENING: ICD-10-CM

## 2023-11-28 ENCOUNTER — DOCUMENTATION ONLY (OUTPATIENT)
Dept: FAMILY MEDICINE | Facility: CLINIC | Age: 53
End: 2023-11-28
Payer: COMMERCIAL

## 2023-11-28 NOTE — PROGRESS NOTES
I called her with her ultrasound report.  This is most likely a lipoma.  I did tell her if it enlarges or becomes painful she should follow-up and may consider MRI.    KAH

## 2023-12-30 LAB — NONINV COLON CA DNA+OCC BLD SCRN STL QL: NEGATIVE

## 2023-12-31 ENCOUNTER — HEALTH MAINTENANCE LETTER (OUTPATIENT)
Age: 53
End: 2023-12-31

## 2024-01-25 DIAGNOSIS — N95.1 MENOPAUSAL SYNDROME (HOT FLASHES): ICD-10-CM

## 2024-01-26 RX ORDER — SUMATRIPTAN 25 MG/1
TABLET, FILM COATED ORAL
Qty: 18 TABLET | Refills: 3 | Status: SHIPPED | OUTPATIENT
Start: 2024-01-26

## 2024-03-02 DIAGNOSIS — E06.3 HYPOTHYROIDISM DUE TO HASHIMOTO'S THYROIDITIS: ICD-10-CM

## 2024-03-04 RX ORDER — LEVOTHYROXINE SODIUM 100 UG/1
100 TABLET ORAL DAILY
Qty: 90 TABLET | Refills: 3 | Status: SHIPPED | OUTPATIENT
Start: 2024-03-04

## 2024-03-12 DIAGNOSIS — G43.109 MIGRAINE WITH AURA AND WITHOUT STATUS MIGRAINOSUS, NOT INTRACTABLE: ICD-10-CM

## 2024-03-15 RX ORDER — AMITRIPTYLINE HYDROCHLORIDE 50 MG/1
TABLET ORAL
Qty: 90 TABLET | Refills: 1 | Status: SHIPPED | OUTPATIENT
Start: 2024-03-15 | End: 2024-09-24

## 2024-09-23 DIAGNOSIS — G43.109 MIGRAINE WITH AURA AND WITHOUT STATUS MIGRAINOSUS, NOT INTRACTABLE: ICD-10-CM

## 2024-09-24 RX ORDER — AMITRIPTYLINE HYDROCHLORIDE 50 MG/1
TABLET ORAL
Qty: 60 TABLET | Refills: 0 | Status: SHIPPED | OUTPATIENT
Start: 2024-09-24

## 2024-10-11 ENCOUNTER — PATIENT OUTREACH (OUTPATIENT)
Dept: CARE COORDINATION | Facility: CLINIC | Age: 54
End: 2024-10-11
Payer: COMMERCIAL

## 2024-11-08 ENCOUNTER — PATIENT OUTREACH (OUTPATIENT)
Dept: CARE COORDINATION | Facility: CLINIC | Age: 54
End: 2024-11-08
Payer: COMMERCIAL

## 2024-11-28 DIAGNOSIS — G43.109 MIGRAINE WITH AURA AND WITHOUT STATUS MIGRAINOSUS, NOT INTRACTABLE: ICD-10-CM

## 2024-12-04 RX ORDER — AMITRIPTYLINE HYDROCHLORIDE 50 MG/1
TABLET ORAL
Qty: 90 TABLET | Refills: 3 | Status: SHIPPED | OUTPATIENT
Start: 2024-12-04

## 2024-12-04 NOTE — TELEPHONE ENCOUNTER
Scheduled appt for on 01-09-25. Patient was wondering if she could get her refills before her appt all out.     Genesis Herr on 12/4/2024 at 2:17 PM

## 2025-01-09 ENCOUNTER — OFFICE VISIT (OUTPATIENT)
Dept: FAMILY MEDICINE | Facility: CLINIC | Age: 55
End: 2025-01-09
Payer: COMMERCIAL

## 2025-01-09 VITALS
DIASTOLIC BLOOD PRESSURE: 86 MMHG | BODY MASS INDEX: 28.85 KG/M2 | SYSTOLIC BLOOD PRESSURE: 129 MMHG | WEIGHT: 169 LBS | HEIGHT: 64 IN | HEART RATE: 96 BPM | RESPIRATION RATE: 10 BRPM | OXYGEN SATURATION: 96 % | TEMPERATURE: 97.6 F

## 2025-01-09 DIAGNOSIS — G43.109 MIGRAINE WITH AURA AND WITHOUT STATUS MIGRAINOSUS, NOT INTRACTABLE: ICD-10-CM

## 2025-01-09 DIAGNOSIS — Z00.00 HEALTH CARE MAINTENANCE: ICD-10-CM

## 2025-01-09 DIAGNOSIS — N95.1 MENOPAUSAL SYNDROME (HOT FLASHES): ICD-10-CM

## 2025-01-09 DIAGNOSIS — I10 ESSENTIAL HYPERTENSION: ICD-10-CM

## 2025-01-09 DIAGNOSIS — Z23 NEED FOR VACCINATION: ICD-10-CM

## 2025-01-09 DIAGNOSIS — Z12.31 VISIT FOR SCREENING MAMMOGRAM: Primary | ICD-10-CM

## 2025-01-09 DIAGNOSIS — Z12.31 ENCOUNTER FOR SCREENING MAMMOGRAM FOR BREAST CANCER: ICD-10-CM

## 2025-01-09 DIAGNOSIS — E06.3 HYPOTHYROIDISM DUE TO HASHIMOTO'S THYROIDITIS: ICD-10-CM

## 2025-01-09 RX ORDER — LEVOTHYROXINE SODIUM 100 UG/1
100 TABLET ORAL DAILY
Qty: 90 TABLET | Refills: 3 | Status: SHIPPED | OUTPATIENT
Start: 2025-01-09

## 2025-01-09 RX ORDER — SUMATRIPTAN SUCCINATE 25 MG/1
TABLET ORAL
Qty: 18 TABLET | Refills: 3 | Status: SHIPPED | OUTPATIENT
Start: 2025-01-09

## 2025-01-09 RX ORDER — LOSARTAN POTASSIUM AND HYDROCHLOROTHIAZIDE 25; 100 MG/1; MG/1
1 TABLET ORAL DAILY
Qty: 90 TABLET | Refills: 3 | Status: SHIPPED | OUTPATIENT
Start: 2025-01-09

## 2025-01-09 RX ORDER — ESTRADIOL 1 MG/1
TABLET ORAL
Qty: 45 TABLET | Refills: 3 | Status: SHIPPED | OUTPATIENT
Start: 2025-01-09

## 2025-01-09 SDOH — HEALTH STABILITY: PHYSICAL HEALTH: ON AVERAGE, HOW MANY DAYS PER WEEK DO YOU ENGAGE IN MODERATE TO STRENUOUS EXERCISE (LIKE A BRISK WALK)?: 4 DAYS

## 2025-01-09 SDOH — HEALTH STABILITY: PHYSICAL HEALTH: ON AVERAGE, HOW MANY MINUTES DO YOU ENGAGE IN EXERCISE AT THIS LEVEL?: 30 MIN

## 2025-01-09 ASSESSMENT — SOCIAL DETERMINANTS OF HEALTH (SDOH): HOW OFTEN DO YOU GET TOGETHER WITH FRIENDS OR RELATIVES?: MORE THAN THREE TIMES A WEEK

## 2025-01-09 NOTE — ASSESSMENT & PLAN NOTE
- noticeably improved since SALINA  - having success with sumatriptan 25-50mg as needed  - good response on amitriptyline 50mg at bedtime

## 2025-01-09 NOTE — ASSESSMENT & PLAN NOTE
- TAHBSO   - mammo q2 yrs   - elevated FBS; 107   - dyslipidemia - moderately elevated LDL, triglycerides   - Cologuard negative '23; repeat in '26   - Adult vaccinations discussed and updated accordingly

## 2025-01-09 NOTE — PROGRESS NOTES
"Preventive Care Visit  [facility]  Valerio De La Rosa MD, Internal Medicine  Jan 9, 2025      Assessment & Plan   Problem List Items Addressed This Visit          Nervous and Auditory    Migraine with aura and without status migrainosus, not intractable     - noticeably improved since SALINA  - having success with sumatriptan 25-50mg as needed  - good response on amitriptyline 50mg at bedtime             Relevant Medications    SUMAtriptan (IMITREX) 25 MG tablet       Endocrine    Hypothyroidism due to Hashimoto's thyroiditis     - on levothyroxine 100mcg         Relevant Medications    levothyroxine (SYNTHROID/LEVOTHROID) 100 MCG tablet    Other Relevant Orders    TSH WITH FREE T4 REFLEX       Circulatory    Essential hypertension     controlled   current antihypertensive regimen: losartan/hctz 100/25mg daily  regimen changes: none  intolerance:  future titration/work-up plan:            Relevant Medications    losartan-hydrochlorothiazide (HYZAAR) 100-25 MG tablet    Other Relevant Orders    BASIC METABOLIC PANEL       Other    Health care maintenance      - TAHBSO   - mammo q2 yrs   - elevated FBS; 107   - dyslipidemia - moderately elevated LDL, triglycerides   - Cologuard negative '23; repeat in '26   - Adult vaccinations discussed and updated accordingly         Relevant Orders    REVIEW OF HEALTH MAINTENANCE PROTOCOL ORDERS (Completed)     Other Visit Diagnoses       Visit for screening mammogram    -  Primary    Menopausal syndrome (hot flashes)        Relevant Medications    estradiol (ESTRACE) 1 MG tablet    SUMAtriptan (IMITREX) 25 MG tablet    Need for vaccination        Encounter for screening mammogram for breast cancer        Relevant Orders    MA Screening Bilateral w/ Koby                BMI  Estimated body mass index is 29.01 kg/m  as calculated from the following:    Height as of this encounter: 1.626 m (5' 4\").    Weight as of this encounter: 76.7 kg (169 lb).   Weight " management plan: Discussed healthy diet and exercise guidelines    Counseling  Appropriate preventive services were addressed with this patient via screening, questionnaire, or discussion as appropriate for fall prevention, nutrition, physical activity, Tobacco-use cessation, social engagement, weight loss and cognition.  Checklist reviewing preventive services available has been given to the patient.  Reviewed patient's diet, addressing concerns and/or questions.     FUTURE APPOINTMENTS:       - Follow-up visit in 12 months      Marta Aceves is a 54 year old, presenting for the following: Returns for annual physical.  Generally doing well.  Still taking estrogen approximately every other day -no real noticeable hot flashes or perimenopausal symptoms.  No issues since hysterectomy.  Still taking amitriptyline for tension based headaches -generally works well.  Takes sumatriptan intermittently which also help.  Physical        1/9/2025     3:41 PM   Additional Questions   Roomed by Michaela HERNÁNDEZ    Health Care Directive  Patient does not have a Health Care Directive: Discussed advance care planning with patient; however, patient declined at this time.      1/9/2025   General Health   How would you rate your overall physical health? Excellent   Feel stress (tense, anxious, or unable to sleep) Not at all         1/9/2025   Nutrition   Three or more servings of calcium each day? Yes   Diet: Regular (no restrictions)   How many servings of fruit and vegetables per day? (!) 2-3   How many sweetened beverages each day? 0-1         1/9/2025   Exercise   Days per week of moderate/strenous exercise 4 days   Average minutes spent exercising at this level 30 min         1/9/2025   Social Factors   Frequency of gathering with friends or relatives More than three times a week   Worry food won't last until get money to buy more No   Food not last or not have enough money for food? No   Do you have housing?  (Housing is defined as stable permanent housing and does not include staying ouside in a car, in a tent, in an abandoned building, in an overnight shelter, or couch-surfing.) Yes   Are you worried about losing your housing? No   Lack of transportation? No   Unable to get utilities (heat,electricity)? No         1/9/2025   Fall Risk   Fallen 2 or more times in the past year? No   Trouble with walking or balance? No          1/9/2025   Dental   Dentist two times every year? Yes         1/9/2025   TB Screening   Were you born outside of the [place]? No         Today's PHQ-2 Score:       1/9/2025     2:47 PM   PHQ-2 ( 1999 Pfizer)   Q1: Little interest or pleasure in doing things 0   Q2: Feeling down, depressed or hopeless 0   PHQ-2 Score 0    Q1: Little interest or pleasure in doing things Not at all   Q2: Feeling down, depressed or hopeless Not at all   PHQ-2 Score 0       Patient-reported           1/9/2025   Substance Use   Alcohol more than 3/day or more than 7/wk No   Do you use any other substances recreationally? No     Social History     Tobacco Use    Smoking status: Never     Passive exposure: Never    Smokeless tobacco: Never   Vaping Use    Vaping status: Never Used   Substance Use Topics    Alcohol use: Not Currently    Drug use: Never                  1/9/2025   STI Screening   New sexual partner(s) since last STI/HIV test? No          ASCVD Risk   The 10-year ASCVD risk score (Bruce DANIELS, et al., 2019) is: 3.8%    Values used to calculate the score:      Age: 54 years      Sex: Female      Is Non- : No      Diabetic: No      Tobacco smoker: No      Systolic Blood Pressure: 133 mmHg      Is BP treated: Yes      HDL Cholesterol: 55 mg/dL      Total Cholesterol: 239 mg/dL           Reviewed and updated as needed this visit by Provider                        Review of Systems  Constitutional, HEENT, cardiovascular, pulmonary, gi and gu systems are negative, except as otherwise  "noted.     Objective    Exam  BP (!) 133/93   Pulse 96   Temp 97.6  F (36.4  C)   Resp 10   Ht 1.626 m (5' 4\")   Wt 76.7 kg (169 lb)   LMP 03/24/2020 (Approximate)   SpO2 96%   BMI 29.01 kg/m     Estimated body mass index is 29.01 kg/m  as calculated from the following:    Height as of this encounter: 1.626 m (5' 4\").    Weight as of this encounter: 76.7 kg (169 lb).    Physical Exam  GENERAL: alert and no distress  NECK: no adenopathy, no asymmetry, masses, or scars  RESP: lungs clear to auscultation - no rales, rhonchi or wheezes  CV: regular rate and rhythm, normal S1 S2, no S3 or S4, no murmur, click or rub, no peripheral edema  ABDOMEN: soft, nontender, no hepatosplenomegaly, no masses and bowel sounds normal  MS: no gross musculoskeletal defects noted, no edema        Signed Electronically by: Valerio De La Rosa MD    "

## 2025-01-15 ENCOUNTER — TELEPHONE (OUTPATIENT)
Dept: FAMILY MEDICINE | Facility: CLINIC | Age: 55
End: 2025-01-15
Payer: COMMERCIAL

## 2025-01-15 NOTE — TELEPHONE ENCOUNTER
----- Message from Valerio De La Rosa sent at 1/15/2025  9:28 AM CST -----  Team - please call patient with results.    No viewing of [employer] message by pt.  I've increased her levothyroxine dosing

## 2025-01-19 ENCOUNTER — HEALTH MAINTENANCE LETTER (OUTPATIENT)
Age: 55
End: 2025-01-19